# Patient Record
Sex: MALE | Race: WHITE | NOT HISPANIC OR LATINO
[De-identification: names, ages, dates, MRNs, and addresses within clinical notes are randomized per-mention and may not be internally consistent; named-entity substitution may affect disease eponyms.]

---

## 2019-03-13 ENCOUNTER — APPOINTMENT (OUTPATIENT)
Dept: INTERNAL MEDICINE | Facility: CLINIC | Age: 54
End: 2019-03-13
Payer: COMMERCIAL

## 2019-03-13 VITALS — SYSTOLIC BLOOD PRESSURE: 160 MMHG | DIASTOLIC BLOOD PRESSURE: 110 MMHG | RESPIRATION RATE: 16 BRPM | TEMPERATURE: 98 F

## 2019-03-13 VITALS
HEIGHT: 73 IN | BODY MASS INDEX: 31.28 KG/M2 | WEIGHT: 236 LBS | SYSTOLIC BLOOD PRESSURE: 128 MMHG | DIASTOLIC BLOOD PRESSURE: 82 MMHG

## 2019-03-13 DIAGNOSIS — Z78.9 OTHER SPECIFIED HEALTH STATUS: ICD-10-CM

## 2019-03-13 PROCEDURE — 99213 OFFICE O/P EST LOW 20 MIN: CPT

## 2019-03-13 NOTE — ASSESSMENT
[FreeTextEntry1] : right knee pain--no overt injury\par aodm\par htn\par \par f/u with reg f/u 5/19\par xray and ptx of right knee--consider further rxn in not improving

## 2019-03-13 NOTE — PHYSICAL EXAM
[No Acute Distress] : no acute distress [No Respiratory Distress] : no respiratory distress  [Normal Rate] : normal rate  [Regular Rhythm] : with a regular rhythm [Soft] : abdomen soft [de-identified] : local medial right knee pain--no instability

## 2019-04-02 ENCOUNTER — RX RENEWAL (OUTPATIENT)
Age: 54
End: 2019-04-02

## 2019-04-02 RX ORDER — BLOOD SUGAR DIAGNOSTIC
STRIP MISCELLANEOUS
Qty: 400 | Refills: 0 | Status: ACTIVE | COMMUNITY
Start: 2019-04-02 | End: 1900-01-01

## 2019-04-09 ENCOUNTER — RX RENEWAL (OUTPATIENT)
Age: 54
End: 2019-04-09

## 2019-05-09 DIAGNOSIS — M25.50 PAIN IN UNSPECIFIED JOINT: ICD-10-CM

## 2019-07-12 ENCOUNTER — RX RENEWAL (OUTPATIENT)
Age: 54
End: 2019-07-12

## 2019-08-15 ENCOUNTER — RX RENEWAL (OUTPATIENT)
Age: 54
End: 2019-08-15

## 2019-10-08 ENCOUNTER — NON-APPOINTMENT (OUTPATIENT)
Age: 54
End: 2019-10-08

## 2019-10-08 ENCOUNTER — MED ADMIN CHARGE (OUTPATIENT)
Age: 54
End: 2019-10-08

## 2019-10-08 ENCOUNTER — APPOINTMENT (OUTPATIENT)
Dept: INTERNAL MEDICINE | Facility: CLINIC | Age: 54
End: 2019-10-08
Payer: COMMERCIAL

## 2019-10-08 ENCOUNTER — LABORATORY RESULT (OUTPATIENT)
Age: 54
End: 2019-10-08

## 2019-10-08 VITALS
BODY MASS INDEX: 30.09 KG/M2 | SYSTOLIC BLOOD PRESSURE: 121 MMHG | OXYGEN SATURATION: 97 % | DIASTOLIC BLOOD PRESSURE: 80 MMHG | HEART RATE: 59 BPM | HEIGHT: 73 IN | WEIGHT: 227 LBS | RESPIRATION RATE: 16 BRPM | TEMPERATURE: 97.9 F

## 2019-10-08 PROCEDURE — 90471 IMMUNIZATION ADMIN: CPT

## 2019-10-08 PROCEDURE — 93000 ELECTROCARDIOGRAM COMPLETE: CPT

## 2019-10-08 PROCEDURE — 36415 COLL VENOUS BLD VENIPUNCTURE: CPT

## 2019-10-08 PROCEDURE — G0444 DEPRESSION SCREEN ANNUAL: CPT

## 2019-10-08 PROCEDURE — 99396 PREV VISIT EST AGE 40-64: CPT | Mod: 25

## 2019-10-08 PROCEDURE — 90688 IIV4 VACCINE SPLT 0.5 ML IM: CPT

## 2019-10-08 RX ORDER — METFORMIN HYDROCHLORIDE 1000 MG/1
1000 TABLET, EXTENDED RELEASE ORAL
Refills: 0 | Status: DISCONTINUED | COMMUNITY
End: 2019-10-08

## 2019-10-08 NOTE — HEALTH RISK ASSESSMENT
[Excellent] : ~his/her~  mood as  excellent [Yes] : Yes [Monthly or less (1 pt)] : Monthly or less (1 point) [1 or 2 (0 pts)] : 1 or 2 (0 points) [No falls in past year] : Patient reported no falls in the past year [Never (0 pts)] : Never (0 points) [No] : In the past 12 months have you used drugs other than those required for medical reasons? No [0] : 1) Little interest or pleasure doing things: Not at all (0) [1] : 2) Feeling down, depressed, or hopeless for several days (1) [No Retinopathy] : No retinopathy [Patient reported colonoscopy was normal] : Patient reported colonoscopy was normal [Fully functional (bathing, dressing, toileting, transferring, walking, feeding)] : Fully functional (bathing, dressing, toileting, transferring, walking, feeding) [Fully functional (using the telephone, shopping, preparing meals, housekeeping, doing laundry, using] : Fully functional and needs no help or supervision to perform IADLs (using the telephone, shopping, preparing meals, housekeeping, doing laundry, using transportation, managing medications and managing finances) [] : No [Audit-CScore] : 1 [[id number]] : 1 [EyeExamDate] : 09/18 [Reports changes in hearing] : Reports no changes in hearing [Reports changes in vision] : Reports no changes in vision [ColonoscopyDate] : 11/16 [ColonoscopyComments] : repeat 2019

## 2019-10-08 NOTE — HISTORY OF PRESENT ILLNESS
[de-identified] : 53 yo male here for annual wellness exam. pt lost 30 pounds with dietary changes, would like to decrease metformin dose if a1c is stable and possibly stop hctz [FreeTextEntry1] : cpx

## 2019-10-15 LAB
ALBUMIN SERPL ELPH-MCNC: 4.2 G/DL
ALP BLD-CCNC: 103 U/L
ALT SERPL-CCNC: 33 U/L
ANION GAP SERPL CALC-SCNC: 13 MMOL/L
APPEARANCE: CLEAR
AST SERPL-CCNC: 32 U/L
BACTERIA: NEGATIVE
BASOPHILS # BLD AUTO: 0.01 K/UL
BASOPHILS NFR BLD AUTO: 0.3 %
BILIRUB SERPL-MCNC: 0.8 MG/DL
BILIRUBIN URINE: NEGATIVE
BLOOD URINE: NEGATIVE
BUN SERPL-MCNC: 12 MG/DL
CALCIUM OXALATE CRYSTALS: ABNORMAL
CALCIUM SERPL-MCNC: 9.8 MG/DL
CHLORIDE SERPL-SCNC: 101 MMOL/L
CHOLEST SERPL-MCNC: 161 MG/DL
CHOLEST/HDLC SERPL: 3.5 RATIO
CO2 SERPL-SCNC: 27 MMOL/L
COLOR: YELLOW
CREAT SERPL-MCNC: 0.77 MG/DL
EOSINOPHIL # BLD AUTO: 0.1 K/UL
EOSINOPHIL NFR BLD AUTO: 2.7 %
ESTIMATED AVERAGE GLUCOSE: 126 MG/DL
FOLATE SERPL-MCNC: 12.9 NG/ML
GLUCOSE QUALITATIVE U: NEGATIVE
GLUCOSE SERPL-MCNC: 107 MG/DL
HBA1C MFR BLD HPLC: 6 %
HCT VFR BLD CALC: 41 %
HDLC SERPL-MCNC: 46 MG/DL
HGB BLD-MCNC: 13.5 G/DL
HYALINE CASTS: 0 /LPF
IMM GRANULOCYTES NFR BLD AUTO: 0.3 %
KETONES URINE: NEGATIVE
LDLC SERPL CALC-MCNC: 53 MG/DL
LEUKOCYTE ESTERASE URINE: NEGATIVE
LYMPHOCYTES # BLD AUTO: 1.47 K/UL
LYMPHOCYTES NFR BLD AUTO: 39.6 %
MAN DIFF?: NORMAL
MCHC RBC-ENTMCNC: 32.1 PG
MCHC RBC-ENTMCNC: 32.9 GM/DL
MCV RBC AUTO: 97.6 FL
MICROSCOPIC-UA: NORMAL
MONOCYTES # BLD AUTO: 0.63 K/UL
MONOCYTES NFR BLD AUTO: 17 %
NEUTROPHILS # BLD AUTO: 1.49 K/UL
NEUTROPHILS NFR BLD AUTO: 40.1 %
NITRITE URINE: NEGATIVE
PH URINE: 7
PLATELET # BLD AUTO: 68 K/UL
POTASSIUM SERPL-SCNC: 3.8 MMOL/L
PROT SERPL-MCNC: 7.2 G/DL
PROTEIN URINE: NORMAL
PSA FREE FLD-MCNC: 39 %
PSA FREE SERPL-MCNC: 0.15 NG/ML
PSA SERPL-MCNC: 0.38 NG/ML
RBC # BLD: 4.2 M/UL
RBC # FLD: 13.5 %
RED BLOOD CELLS URINE: 0 /HPF
SODIUM SERPL-SCNC: 141 MMOL/L
SPECIFIC GRAVITY URINE: 1.02
SQUAMOUS EPITHELIAL CELLS: 0 /HPF
TRIGL SERPL-MCNC: 308 MG/DL
TSH SERPL-ACNC: 1.81 UIU/ML
UROBILINOGEN URINE: ABNORMAL
VIT B12 SERPL-MCNC: 362 PG/ML
WBC # FLD AUTO: 3.71 K/UL
WHITE BLOOD CELLS URINE: 0 /HPF

## 2019-11-04 ENCOUNTER — MEDICATION RENEWAL (OUTPATIENT)
Age: 54
End: 2019-11-04

## 2019-11-07 ENCOUNTER — MEDICATION RENEWAL (OUTPATIENT)
Age: 54
End: 2019-11-07

## 2020-02-04 ENCOUNTER — APPOINTMENT (OUTPATIENT)
Dept: INTERNAL MEDICINE | Facility: CLINIC | Age: 55
End: 2020-02-04
Payer: COMMERCIAL

## 2020-02-04 VITALS
DIASTOLIC BLOOD PRESSURE: 89 MMHG | RESPIRATION RATE: 16 BRPM | WEIGHT: 227 LBS | OXYGEN SATURATION: 97 % | TEMPERATURE: 98 F | HEART RATE: 100 BPM | SYSTOLIC BLOOD PRESSURE: 135 MMHG | BODY MASS INDEX: 30.09 KG/M2 | HEIGHT: 73 IN

## 2020-02-04 PROCEDURE — 36415 COLL VENOUS BLD VENIPUNCTURE: CPT

## 2020-02-04 PROCEDURE — 99214 OFFICE O/P EST MOD 30 MIN: CPT | Mod: 25

## 2020-02-04 NOTE — HISTORY OF PRESENT ILLNESS
[FreeTextEntry8] : 53 yo male here to assess right heel after stepped on broken glass bottle. pt believes that he got it all out yesterday, last tdap was <10 years ago. pt has some mild pain with bearing weight in the area

## 2020-02-04 NOTE — PHYSICAL EXAM
[No Rash] : no rash [Normal] : affect was normal and insight and judgment were intact [de-identified] : +healing puncture wound on right heel without erythema, drainage; slight TTP, no glass visualized on inspection

## 2020-02-04 NOTE — PLAN
[FreeTextEntry1] : clean with warm, soapy water, apply bacitracin twice a day\par pt currently on metformin 2 tabs daily (decr from 4 tabs 4 months ago), will f/u a1c to determine if cont 2 tabs or increase- will send refill at that time

## 2020-02-10 LAB
ALBUMIN SERPL ELPH-MCNC: 4.1 G/DL
ALP BLD-CCNC: 59 U/L
ALT SERPL-CCNC: 34 U/L
ANION GAP SERPL CALC-SCNC: 14 MMOL/L
AST SERPL-CCNC: 37 U/L
BASOPHILS # BLD AUTO: 0.04 K/UL
BASOPHILS NFR BLD AUTO: 0.7 %
BILIRUB SERPL-MCNC: 0.9 MG/DL
BUN SERPL-MCNC: 8 MG/DL
CALCIUM SERPL-MCNC: 9.4 MG/DL
CHLORIDE SERPL-SCNC: 107 MMOL/L
CO2 SERPL-SCNC: 24 MMOL/L
CREAT SERPL-MCNC: 0.83 MG/DL
EOSINOPHIL # BLD AUTO: 0.06 K/UL
EOSINOPHIL NFR BLD AUTO: 1.1 %
ESTIMATED AVERAGE GLUCOSE: 160 MG/DL
GLUCOSE SERPL-MCNC: 170 MG/DL
HBA1C MFR BLD HPLC: 7.2 %
HCT VFR BLD CALC: 47.6 %
HGB BLD-MCNC: 16.1 G/DL
IMM GRANULOCYTES NFR BLD AUTO: 0.2 %
LYMPHOCYTES # BLD AUTO: 2.19 K/UL
LYMPHOCYTES NFR BLD AUTO: 38.8 %
MAN DIFF?: NORMAL
MCHC RBC-ENTMCNC: 31.8 PG
MCHC RBC-ENTMCNC: 33.8 GM/DL
MCV RBC AUTO: 93.9 FL
MONOCYTES # BLD AUTO: 0.77 K/UL
MONOCYTES NFR BLD AUTO: 13.6 %
NEUTROPHILS # BLD AUTO: 2.58 K/UL
NEUTROPHILS NFR BLD AUTO: 45.6 %
PLATELET # BLD AUTO: 86 K/UL
POTASSIUM SERPL-SCNC: 4.3 MMOL/L
PROT SERPL-MCNC: 7.3 G/DL
RBC # BLD: 5.07 M/UL
RBC # FLD: 13.8 %
SODIUM SERPL-SCNC: 145 MMOL/L
WBC # FLD AUTO: 5.65 K/UL

## 2020-04-16 ENCOUNTER — RX RENEWAL (OUTPATIENT)
Age: 55
End: 2020-04-16

## 2020-04-24 ENCOUNTER — RX RENEWAL (OUTPATIENT)
Age: 55
End: 2020-04-24

## 2020-05-12 ENCOUNTER — APPOINTMENT (OUTPATIENT)
Dept: INTERNAL MEDICINE | Facility: CLINIC | Age: 55
End: 2020-05-12
Payer: COMMERCIAL

## 2020-05-12 ENCOUNTER — LABORATORY RESULT (OUTPATIENT)
Age: 55
End: 2020-05-12

## 2020-05-12 VITALS
DIASTOLIC BLOOD PRESSURE: 84 MMHG | TEMPERATURE: 98.2 F | BODY MASS INDEX: 31.14 KG/M2 | OXYGEN SATURATION: 97 % | HEART RATE: 80 BPM | HEIGHT: 73 IN | SYSTOLIC BLOOD PRESSURE: 146 MMHG | WEIGHT: 235 LBS | RESPIRATION RATE: 18 BRPM

## 2020-05-12 VITALS — SYSTOLIC BLOOD PRESSURE: 130 MMHG | DIASTOLIC BLOOD PRESSURE: 80 MMHG
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
## 2020-05-12 PROCEDURE — 99214 OFFICE O/P EST MOD 30 MIN: CPT | Mod: 25

## 2020-05-12 PROCEDURE — 36415 COLL VENOUS BLD VENIPUNCTURE: CPT

## 2020-05-12 NOTE — PHYSICAL EXAM
[No Varicosities] : no varicosities [Pedal Pulses Present] : the pedal pulses are present [No Edema] : there was no peripheral edema [No Extremity Clubbing/Cyanosis] : no extremity clubbing/cyanosis [Normal] : affect was normal and insight and judgment were intact

## 2020-05-12 NOTE — HISTORY OF PRESENT ILLNESS
[Diabetes Mellitus] : Diabetes Mellitus [Other: ___] : [unfilled] [Patient was last seen on ___] : Patient was last seen on [unfilled] [Most Recent A1C: ___] : Most recent A1C was [unfilled] [Target A1C:  ___] : Target A1C is [unfilled] [Near target goal] : A1C near target goal [No therapy] : Patient is not on statin therapy [FreeTextEntry6] : pt c/o "circulation issues in feet" - states that sometimes they feel slightly numb after standing on them all day at work  [Does not check] : Patient does not check blood glucose regularly [Regular podiatrist visits] : Patient had regular podiatrist visits [Understanding of foot care] : Patient expressed understanding of foot care [FreeTextEntry1] : check platelets today after d/c of asa 81 mg

## 2020-05-18 LAB
ALBUMIN SERPL ELPH-MCNC: 4.4 G/DL
ALP BLD-CCNC: 67 U/L
ALT SERPL-CCNC: 25 U/L
ANION GAP SERPL CALC-SCNC: 13 MMOL/L
AST SERPL-CCNC: 30 U/L
BASOPHILS # BLD AUTO: 0.02 K/UL
BASOPHILS NFR BLD AUTO: 0.4 %
BILIRUB SERPL-MCNC: 1.4 MG/DL
BUN SERPL-MCNC: 14 MG/DL
CALCIUM SERPL-MCNC: 9.3 MG/DL
CHLORIDE SERPL-SCNC: 103 MMOL/L
CO2 SERPL-SCNC: 25 MMOL/L
CREAT SERPL-MCNC: 0.74 MG/DL
EOSINOPHIL # BLD AUTO: 0.1 K/UL
EOSINOPHIL NFR BLD AUTO: 1.8 %
ESTIMATED AVERAGE GLUCOSE: 154 MG/DL
GLUCOSE SERPL-MCNC: 102 MG/DL
HBA1C MFR BLD HPLC: 7 %
HCT VFR BLD CALC: 42.4 %
HGB BLD-MCNC: 14.4 G/DL
IMM GRANULOCYTES NFR BLD AUTO: 0.2 %
LYMPHOCYTES # BLD AUTO: 1.44 K/UL
LYMPHOCYTES NFR BLD AUTO: 26.1 %
MAN DIFF?: NORMAL
MCHC RBC-ENTMCNC: 31.6 PG
MCHC RBC-ENTMCNC: 34 GM/DL
MCV RBC AUTO: 93 FL
MONOCYTES # BLD AUTO: 0.72 K/UL
MONOCYTES NFR BLD AUTO: 13.1 %
NEUTROPHILS # BLD AUTO: 3.22 K/UL
NEUTROPHILS NFR BLD AUTO: 58.4 %
PLATELET # BLD AUTO: 58 K/UL
POTASSIUM SERPL-SCNC: 3.9 MMOL/L
PROT SERPL-MCNC: 7.5 G/DL
RBC # BLD: 4.56 M/UL
RBC # FLD: 13.2 %
SARS-COV-2 IGG SERPL IA-ACNC: 0.1 INDEX
SARS-COV-2 IGG SERPL QL IA: NEGATIVE
SODIUM SERPL-SCNC: 141 MMOL/L
WBC # FLD AUTO: 5.51 K/UL

## 2020-07-22 ENCOUNTER — RX RENEWAL (OUTPATIENT)
Age: 55
End: 2020-07-22

## 2020-11-10 ENCOUNTER — RX RENEWAL (OUTPATIENT)
Age: 55
End: 2020-11-10

## 2020-11-16 ENCOUNTER — NON-APPOINTMENT (OUTPATIENT)
Age: 55
End: 2020-11-16

## 2020-11-16 ENCOUNTER — APPOINTMENT (OUTPATIENT)
Dept: INTERNAL MEDICINE | Facility: CLINIC | Age: 55
End: 2020-11-16

## 2020-11-16 ENCOUNTER — RX RENEWAL (OUTPATIENT)
Age: 55
End: 2020-11-16

## 2020-11-16 ENCOUNTER — LABORATORY RESULT (OUTPATIENT)
Age: 55
End: 2020-11-16

## 2020-11-16 ENCOUNTER — APPOINTMENT (OUTPATIENT)
Dept: INTERNAL MEDICINE | Facility: CLINIC | Age: 55
End: 2020-11-16
Payer: COMMERCIAL

## 2020-11-16 VITALS
HEART RATE: 74 BPM | BODY MASS INDEX: 31.41 KG/M2 | OXYGEN SATURATION: 97 % | TEMPERATURE: 98.4 F | DIASTOLIC BLOOD PRESSURE: 74 MMHG | WEIGHT: 237 LBS | SYSTOLIC BLOOD PRESSURE: 136 MMHG | HEIGHT: 73 IN | RESPIRATION RATE: 16 BRPM

## 2020-11-16 DIAGNOSIS — Z83.3 FAMILY HISTORY OF DIABETES MELLITUS: ICD-10-CM

## 2020-11-16 DIAGNOSIS — Z20.828 CONTACT WITH AND (SUSPECTED) EXPOSURE TO OTHER VIRAL COMMUNICABLE DISEASES: ICD-10-CM

## 2020-11-16 PROCEDURE — 36415 COLL VENOUS BLD VENIPUNCTURE: CPT

## 2020-11-16 PROCEDURE — 93000 ELECTROCARDIOGRAM COMPLETE: CPT

## 2020-11-16 PROCEDURE — 99072 ADDL SUPL MATRL&STAF TM PHE: CPT

## 2020-11-16 PROCEDURE — 99396 PREV VISIT EST AGE 40-64: CPT | Mod: 25

## 2020-11-16 RX ORDER — ASPIRIN 81 MG
81 TABLET, DELAYED RELEASE (ENTERIC COATED) ORAL
Refills: 0 | Status: DISCONTINUED | COMMUNITY
End: 2020-11-16

## 2020-11-16 NOTE — HISTORY OF PRESENT ILLNESS
[FreeTextEntry1] : cpx [de-identified] : 56 yo male here for annual wellness exam. pt c/o feet "aching" sporadically for a couple of months that is staying the same that has not changed in frequency or severity that he describes as a sharp pain.

## 2020-11-16 NOTE — HEALTH RISK ASSESSMENT
[Yes] : Yes [1 or 2 (0 pts)] : 1 or 2 (0 points) [Never (0 pts)] : Never (0 points) [No] : In the past 12 months have you used drugs other than those required for medical reasons? No [No falls in past year] : Patient reported no falls in the past year [0] : 2) Feeling down, depressed, or hopeless: Not at all (0) [Fully functional (bathing, dressing, toileting, transferring, walking, feeding)] : Fully functional (bathing, dressing, toileting, transferring, walking, feeding) [Fully functional (using the telephone, shopping, preparing meals, housekeeping, doing laundry, using] : Fully functional and needs no help or supervision to perform IADLs (using the telephone, shopping, preparing meals, housekeeping, doing laundry, using transportation, managing medications and managing finances) [2 - 4 times a month (2 pts)] : 2-4 times a month (2 points) [Patient reported colonoscopy was normal] : Patient reported colonoscopy was normal [Reviewed no changes] : Reviewed no changes [] : No [Audit-CScore] : 2 [UYU1Hhtss] : 0 [ColonoscopyDate] : 10/19 [ColonoscopyComments] : 2 polyps removed, repeat in 2022 [AdvancecareDate] : 11/20

## 2020-11-17 LAB
APPEARANCE: CLEAR
BACTERIA: NEGATIVE
BILIRUBIN URINE: NEGATIVE
BLOOD URINE: NEGATIVE
COLOR: NORMAL
ESTIMATED AVERAGE GLUCOSE: 146 MG/DL
FERRITIN SERPL-MCNC: 1072 NG/ML
FOLATE SERPL-MCNC: 16.4 NG/ML
GLUCOSE QUALITATIVE U: NEGATIVE
HBA1C MFR BLD HPLC: 6.7 %
HCV AB SER QL: NONREACTIVE
HCV S/CO RATIO: 0.11 S/CO
HYALINE CASTS: 0 /LPF
KETONES URINE: NEGATIVE
LEUKOCYTE ESTERASE URINE: NEGATIVE
MICROSCOPIC-UA: NORMAL
NITRITE URINE: NEGATIVE
PH URINE: 6.5
PROTEIN URINE: NEGATIVE
PSA FREE FLD-MCNC: 51 %
PSA FREE SERPL-MCNC: 0.15 NG/ML
PSA SERPL-MCNC: 0.29 NG/ML
RED BLOOD CELLS URINE: 1 /HPF
SPECIFIC GRAVITY URINE: 1.01
SQUAMOUS EPITHELIAL CELLS: 0 /HPF
TSH SERPL-ACNC: 0.19 UIU/ML
UROBILINOGEN URINE: NORMAL
VIT B12 SERPL-MCNC: 325 PG/ML
WHITE BLOOD CELLS URINE: 0 /HPF

## 2020-11-18 LAB
ALBUMIN SERPL ELPH-MCNC: 4.2 G/DL
ALP BLD-CCNC: 66 U/L
ALT SERPL-CCNC: 29 U/L
ANION GAP SERPL CALC-SCNC: 23 MMOL/L
AST SERPL-CCNC: 39 U/L
BASOPHILS # BLD AUTO: 0.01 K/UL
BASOPHILS NFR BLD AUTO: 0.3 %
BILIRUB SERPL-MCNC: 0.8 MG/DL
BUN SERPL-MCNC: 12 MG/DL
CALCIUM SERPL-MCNC: 9.2 MG/DL
CHLORIDE SERPL-SCNC: 102 MMOL/L
CHOLEST SERPL-MCNC: 157 MG/DL
CO2 SERPL-SCNC: 17 MMOL/L
CREAT SERPL-MCNC: 0.83 MG/DL
EOSINOPHIL # BLD AUTO: 0.02 K/UL
EOSINOPHIL NFR BLD AUTO: 0.5 %
GLUCOSE SERPL-MCNC: 148 MG/DL
HCT VFR BLD CALC: 40.3 %
HDLC SERPL-MCNC: 46 MG/DL
HGB BLD-MCNC: 13.4 G/DL
IMM GRANULOCYTES NFR BLD AUTO: 0.3 %
LDLC SERPL CALC-MCNC: 72 MG/DL
LYMPHOCYTES # BLD AUTO: 1.26 K/UL
LYMPHOCYTES NFR BLD AUTO: 33.6 %
MAN DIFF?: NORMAL
MCHC RBC-ENTMCNC: 31.8 PG
MCHC RBC-ENTMCNC: 33.3 GM/DL
MCV RBC AUTO: 95.7 FL
MONOCYTES # BLD AUTO: 0.71 K/UL
MONOCYTES NFR BLD AUTO: 18.9 %
NEUTROPHILS # BLD AUTO: 1.74 K/UL
NEUTROPHILS NFR BLD AUTO: 46.4 %
NONHDLC SERPL-MCNC: 111 MG/DL
PLATELET # BLD AUTO: 48 K/UL
POTASSIUM SERPL-SCNC: 4 MMOL/L
PROT SERPL-MCNC: 7.1 G/DL
RBC # BLD: 4.21 M/UL
RBC # FLD: 13.2 %
SODIUM SERPL-SCNC: 142 MMOL/L
TRIGL SERPL-MCNC: 192 MG/DL
URATE SERPL-MCNC: 4.3 MG/DL
WBC # FLD AUTO: 3.75 K/UL

## 2020-11-20 ENCOUNTER — APPOINTMENT (OUTPATIENT)
Dept: CARDIOLOGY | Facility: CLINIC | Age: 55
End: 2020-11-20
Payer: COMMERCIAL

## 2020-11-20 PROCEDURE — 93306 TTE W/DOPPLER COMPLETE: CPT

## 2020-11-23 ENCOUNTER — NON-APPOINTMENT (OUTPATIENT)
Age: 55
End: 2020-11-23

## 2020-11-23 ENCOUNTER — APPOINTMENT (OUTPATIENT)
Dept: CARDIOLOGY | Facility: CLINIC | Age: 55
End: 2020-11-23
Payer: COMMERCIAL

## 2020-11-23 VITALS
RESPIRATION RATE: 16 BRPM | DIASTOLIC BLOOD PRESSURE: 81 MMHG | HEART RATE: 75 BPM | BODY MASS INDEX: 31.41 KG/M2 | OXYGEN SATURATION: 97 % | HEIGHT: 73 IN | TEMPERATURE: 99.2 F | SYSTOLIC BLOOD PRESSURE: 152 MMHG | WEIGHT: 237 LBS

## 2020-11-23 LAB
TESTOST BND SERPL-MCNC: 3.7 PG/ML
TESTOST SERPL-MCNC: 230.7 NG/DL

## 2020-11-23 PROCEDURE — 93000 ELECTROCARDIOGRAM COMPLETE: CPT

## 2020-11-23 PROCEDURE — 99204 OFFICE O/P NEW MOD 45 MIN: CPT

## 2020-11-23 NOTE — PHYSICAL EXAM
[General Appearance - Well Developed] : well developed [Normal Appearance] : normal appearance [General Appearance - Well Nourished] : well nourished [Heart Rate And Rhythm] : heart rate and rhythm were normal [Heart Sounds] : normal S1 and S2 [Murmurs] : no murmurs present [Edema] : no peripheral edema present [FreeTextEntry1] : radial pulses 2+ b/l [] : no respiratory distress [Respiration, Rhythm And Depth] : normal respiratory rhythm and effort [Exaggerated Use Of Accessory Muscles For Inspiration] : no accessory muscle use [Auscultation Breath Sounds / Voice Sounds] : lungs were clear to auscultation bilaterally [Abdomen Soft] : soft [Abdomen Tenderness] : non-tender [Abnormal Walk] : normal gait [Skin Turgor] : normal skin turgor [Oriented To Time, Place, And Person] : oriented to person, place, and time [Impaired Insight] : insight and judgment were intact [Affect] : the affect was normal [Mood] : the mood was normal

## 2020-11-23 NOTE — HISTORY OF PRESENT ILLNESS
[FreeTextEntry1] : 55 w DM, HTN presents to establish cardiovascular care\par Sent in by PMD: Louise TAVAREZ\par \par pt states overall he feels well. pt denies cp or sob at rest or on exertion. pt does endorse palpitations, sometimes he wakes up with a "jumping" in his chest. pt denies dizziness, syncope, diaphoresis. pt denies LE edema, orthopnea. pt states he can walk several miles without difficulty. 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 pt state he feels run down. low energy level over the past few months. \par \par Prior cardiac workup: none\par Recent labs: 11/2020: tot chol 157 tg 192 hdl 46 ldl 72 Cr 0.83 GFR 98 a1c 6.7\par \par Med hx: DM2 HTN\par Sx hx: appendectomy\par Fam hx: no known cardiac hx. \par Social hx: lives in [place] with wife and 2 kids. 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 \par Meds: losartan-hctz 50-12.5 metformin, viagra prn\par Allergies: nkda\par \par

## 2020-11-23 NOTE — REVIEW OF SYSTEMS
[Feeling Fatigued] : feeling fatigued [Fever] : no fever [Headache] : no headache [Recent Weight Gain (___ Lbs)] : no recent weight gain [Chills] : no chills [Recent Weight Loss (___ Lbs)] : no recent weight loss [Sore Throat] : no sore throat [Shortness Of Breath] : no shortness of breath [Dyspnea on exertion] : not dyspnea during exertion [Chest  Pressure] : no chest pressure [Chest Pain] : no chest pain [Lower Ext Edema] : no extremity edema [Leg Claudication] : no intermittent leg claudication [Palpitations] : no palpitations [Cough] : no cough [Wheezing] : no wheezing [Nausea] : no nausea [Vomiting] : no vomiting [Joint Pain] : no joint pain [Dizziness] : no dizziness [Confusion] : no confusion was observed [Excessive Thirst] : no polydipsia [Easy Bleeding] : no tendency for easy bleeding [Easy Bruising] : no tendency for easy bruising

## 2020-11-23 NOTE — DISCUSSION/SUMMARY
[FreeTextEntry1] : 55 w DM, HTN presents to establish cardiovascular care\par \par 1. health maintenance\par -pt overall feeling well, recent TTE grossly unremarkable except for episodes of frequent irregular dinus beats. baseline EKG without arrhythmia. in the absence of symptoms, will hold off on w/u for now, will cont to monitor.\par -pt has calcium score pending, states he will get it next week, will f/u results\par \par f/u 6 months or sooner if pt requires.

## 2020-12-22 RX ORDER — PENCICLOVIR 10 MG/G
1 CREAM TOPICAL
Qty: 1 | Refills: 0 | Status: DISCONTINUED | COMMUNITY
Start: 2020-07-24 | End: 2020-12-22

## 2020-12-22 RX ORDER — TESTOSTERONE 50 MG/5G
50 MG/5GM GEL TRANSDERMAL
Qty: 30 | Refills: 0 | Status: DISCONTINUED | COMMUNITY
Start: 2020-11-23 | End: 2020-12-22

## 2021-02-11 ENCOUNTER — RX RENEWAL (OUTPATIENT)
Age: 56
End: 2021-02-11

## 2021-04-26 ENCOUNTER — APPOINTMENT (OUTPATIENT)
Dept: INTERNAL MEDICINE | Facility: CLINIC | Age: 56
End: 2021-04-26

## 2021-05-03 ENCOUNTER — APPOINTMENT (OUTPATIENT)
Dept: INTERNAL MEDICINE | Facility: CLINIC | Age: 56
End: 2021-05-03
Payer: COMMERCIAL

## 2021-05-03 VITALS
SYSTOLIC BLOOD PRESSURE: 125 MMHG | HEART RATE: 102 BPM | OXYGEN SATURATION: 97 % | TEMPERATURE: 98.5 F | HEIGHT: 73 IN | BODY MASS INDEX: 30.22 KG/M2 | WEIGHT: 228 LBS | DIASTOLIC BLOOD PRESSURE: 95 MMHG

## 2021-05-03 DIAGNOSIS — Z23 ENCOUNTER FOR IMMUNIZATION: ICD-10-CM

## 2021-05-03 DIAGNOSIS — R79.89 OTHER SPECIFIED ABNORMAL FINDINGS OF BLOOD CHEMISTRY: ICD-10-CM

## 2021-05-03 PROCEDURE — 99214 OFFICE O/P EST MOD 30 MIN: CPT | Mod: 25

## 2021-05-03 PROCEDURE — 36415 COLL VENOUS BLD VENIPUNCTURE: CPT

## 2021-05-03 PROCEDURE — 99072 ADDL SUPL MATRL&STAF TM PHE: CPT

## 2021-05-03 NOTE — HISTORY OF PRESENT ILLNESS
[Diabetes Mellitus] : Diabetes Mellitus [Hypertension] : Hypertension [Other: ___] : [unfilled] [Patient was last seen on ___] : Patient was last seen on [unfilled] [Most Recent A1C: ___] : Most recent A1C was [unfilled] [Target A1C:  ___] : Target A1C is [unfilled] [<140/90] : Target blood pressure is <140/90 [Target goal met] : BP target goal met [FreeTextEntry1] : low t- check bw today, cont med\par thrombocytopenia- educated multiple times to f/u heme but has not yet

## 2021-05-03 NOTE — PLAN
[FreeTextEntry1] : phone number for his hematologist dr radha savage given to patient so he can call for follow up, pt states he is going to call when he leaves here\par bw drawn in office\par proper diet and exercise

## 2021-05-04 LAB
ALBUMIN SERPL ELPH-MCNC: 3.8 G/DL
ALP BLD-CCNC: 81 U/L
ALT SERPL-CCNC: 41 U/L
ANION GAP SERPL CALC-SCNC: 14 MMOL/L
AST SERPL-CCNC: 56 U/L
BASOPHILS # BLD AUTO: 0.02 K/UL
BASOPHILS NFR BLD AUTO: 0.4 %
BILIRUB SERPL-MCNC: 1.5 MG/DL
BUN SERPL-MCNC: 11 MG/DL
CALCIUM SERPL-MCNC: 9.2 MG/DL
CHLORIDE SERPL-SCNC: 105 MMOL/L
CO2 SERPL-SCNC: 22 MMOL/L
CREAT SERPL-MCNC: 0.87 MG/DL
EOSINOPHIL # BLD AUTO: 0.07 K/UL
EOSINOPHIL NFR BLD AUTO: 1.5 %
ESTIMATED AVERAGE GLUCOSE: 143 MG/DL
GLUCOSE SERPL-MCNC: 106 MG/DL
HBA1C MFR BLD HPLC: 6.6 %
HCT VFR BLD CALC: 48.2 %
HGB BLD-MCNC: 15.4 G/DL
IMM GRANULOCYTES NFR BLD AUTO: 0.2 %
LYMPHOCYTES # BLD AUTO: 1.87 K/UL
LYMPHOCYTES NFR BLD AUTO: 40 %
MAN DIFF?: NORMAL
MCHC RBC-ENTMCNC: 30.7 PG
MCHC RBC-ENTMCNC: 32 GM/DL
MCV RBC AUTO: 96 FL
MONOCYTES # BLD AUTO: 0.93 K/UL
MONOCYTES NFR BLD AUTO: 19.9 %
NEUTROPHILS # BLD AUTO: 1.78 K/UL
NEUTROPHILS NFR BLD AUTO: 38 %
PLATELET # BLD AUTO: 67 K/UL
POTASSIUM SERPL-SCNC: 4.5 MMOL/L
PROT SERPL-MCNC: 7 G/DL
RBC # BLD: 5.02 M/UL
RBC # FLD: 14.9 %
SODIUM SERPL-SCNC: 142 MMOL/L
TESTOST SERPL-MCNC: 244 NG/DL
WBC # FLD AUTO: 4.68 K/UL

## 2021-05-17 ENCOUNTER — RX RENEWAL (OUTPATIENT)
Age: 56
End: 2021-05-17

## 2021-06-01 ENCOUNTER — APPOINTMENT (OUTPATIENT)
Dept: CARDIOLOGY | Facility: CLINIC | Age: 56
End: 2021-06-01

## 2021-06-17 ENCOUNTER — APPOINTMENT (OUTPATIENT)
Dept: CARDIOLOGY | Facility: CLINIC | Age: 56
End: 2021-06-17

## 2021-10-28 ENCOUNTER — APPOINTMENT (OUTPATIENT)
Dept: INTERNAL MEDICINE | Facility: CLINIC | Age: 56
End: 2021-10-28
Payer: COMMERCIAL

## 2021-10-28 VITALS
WEIGHT: 226 LBS | BODY MASS INDEX: 29.95 KG/M2 | HEART RATE: 108 BPM | OXYGEN SATURATION: 96 % | TEMPERATURE: 97.7 F | HEIGHT: 73 IN

## 2021-10-28 VITALS — DIASTOLIC BLOOD PRESSURE: 82 MMHG | SYSTOLIC BLOOD PRESSURE: 132 MMHG

## 2021-10-28 DIAGNOSIS — M62.838 OTHER MUSCLE SPASM: ICD-10-CM

## 2021-10-28 PROCEDURE — 36415 COLL VENOUS BLD VENIPUNCTURE: CPT

## 2021-10-28 PROCEDURE — 99213 OFFICE O/P EST LOW 20 MIN: CPT | Mod: 25

## 2021-10-28 NOTE — HISTORY OF PRESENT ILLNESS
[FreeTextEntry1] : shoulder pain [de-identified] : about 3 yrs ago developed shoulder pain--worse now--saw ortho--offered surgery but he deferred\par meds and recent labs reviewed\par saw heme prior for low plts

## 2021-10-28 NOTE — PHYSICAL EXAM
[Normal] : no respiratory distress, lungs were clear to auscultation bilaterally and no accessory muscle use [de-identified] : local muscle spasm in left paracervical regiion--decrease ROM of motion of shoulder

## 2021-10-29 LAB
BASOPHILS # BLD AUTO: 0.01 K/UL
BASOPHILS NFR BLD AUTO: 0.2 %
EOSINOPHIL # BLD AUTO: 0.13 K/UL
EOSINOPHIL NFR BLD AUTO: 2.2 %
ESTIMATED AVERAGE GLUCOSE: 146 MG/DL
HBA1C MFR BLD HPLC: 6.7 %
HCT VFR BLD CALC: 43.8 %
HGB BLD-MCNC: 14.4 G/DL
IMM GRANULOCYTES NFR BLD AUTO: 0.2 %
LYMPHOCYTES # BLD AUTO: 2.49 K/UL
LYMPHOCYTES NFR BLD AUTO: 41.3 %
MAN DIFF?: NORMAL
MCHC RBC-ENTMCNC: 31.9 PG
MCHC RBC-ENTMCNC: 32.9 GM/DL
MCV RBC AUTO: 97.1 FL
MONOCYTES # BLD AUTO: 0.93 K/UL
MONOCYTES NFR BLD AUTO: 15.4 %
NEUTROPHILS # BLD AUTO: 2.46 K/UL
NEUTROPHILS NFR BLD AUTO: 40.7 %
PLATELET # BLD AUTO: 71 K/UL
RBC # BLD: 4.51 M/UL
RBC # FLD: 13.3 %
WBC # FLD AUTO: 6.03 K/UL

## 2021-11-22 ENCOUNTER — RX RENEWAL (OUTPATIENT)
Age: 56
End: 2021-11-22

## 2022-02-24 ENCOUNTER — APPOINTMENT (OUTPATIENT)
Dept: INTERNAL MEDICINE | Facility: CLINIC | Age: 57
End: 2022-02-24

## 2022-02-24 ENCOUNTER — RX CHANGE (OUTPATIENT)
Age: 57
End: 2022-02-24

## 2022-02-25 ENCOUNTER — RX CHANGE (OUTPATIENT)
Age: 57
End: 2022-02-25

## 2022-03-04 ENCOUNTER — RX CHANGE (OUTPATIENT)
Age: 57
End: 2022-03-04

## 2022-03-10 ENCOUNTER — NON-APPOINTMENT (OUTPATIENT)
Age: 57
End: 2022-03-10

## 2022-03-10 ENCOUNTER — APPOINTMENT (OUTPATIENT)
Dept: INTERNAL MEDICINE | Facility: CLINIC | Age: 57
End: 2022-03-10
Payer: COMMERCIAL

## 2022-03-10 VITALS
SYSTOLIC BLOOD PRESSURE: 129 MMHG | OXYGEN SATURATION: 97 % | BODY MASS INDEX: 29.29 KG/M2 | TEMPERATURE: 97.4 F | WEIGHT: 221 LBS | DIASTOLIC BLOOD PRESSURE: 80 MMHG | HEIGHT: 73 IN | HEART RATE: 100 BPM

## 2022-03-10 DIAGNOSIS — N52.9 MALE ERECTILE DYSFUNCTION, UNSPECIFIED: ICD-10-CM

## 2022-03-10 PROCEDURE — 93000 ELECTROCARDIOGRAM COMPLETE: CPT

## 2022-03-10 PROCEDURE — 36415 COLL VENOUS BLD VENIPUNCTURE: CPT

## 2022-03-10 PROCEDURE — 99214 OFFICE O/P EST MOD 30 MIN: CPT | Mod: 25

## 2022-03-10 RX ORDER — LOSARTAN POTASSIUM 50 MG/1
50 TABLET, FILM COATED ORAL
Qty: 90 | Refills: 0 | Status: DISCONTINUED | COMMUNITY
Start: 2019-07-12 | End: 2022-03-10

## 2022-03-10 NOTE — ASSESSMENT
[FreeTextEntry1] : pods eval\par check ecg\par renew meds\par d/w Dr. Virgen re: new afib--he will  see pt\par pt advised to contact provider in any cardiac symps develop\par check labs

## 2022-03-10 NOTE — HISTORY OF PRESENT ILLNESS
[FreeTextEntry1] : f/u htn, diabetes [de-identified] : reviewed labs and meds\par c/o foot numbness\par no cardio symps

## 2022-03-10 NOTE — PHYSICAL EXAM
[Normal] : soft, non-tender, non-distended, no masses palpated, no HSM and normal bowel sounds [de-identified] : irreg

## 2022-03-11 LAB
ALBUMIN SERPL ELPH-MCNC: 3.9 G/DL
ALP BLD-CCNC: 127 U/L
ALT SERPL-CCNC: 32 U/L
ANION GAP SERPL CALC-SCNC: 15 MMOL/L
AST SERPL-CCNC: 31 U/L
BASOPHILS # BLD AUTO: 0.04 K/UL
BASOPHILS NFR BLD AUTO: 0.7 %
BILIRUB SERPL-MCNC: 1.3 MG/DL
BUN SERPL-MCNC: 12 MG/DL
CALCIUM SERPL-MCNC: 9.6 MG/DL
CHLORIDE SERPL-SCNC: 102 MMOL/L
CO2 SERPL-SCNC: 23 MMOL/L
CREAT SERPL-MCNC: 0.73 MG/DL
EGFR: 106 ML/MIN/1.73M2
EOSINOPHIL # BLD AUTO: 0.1 K/UL
EOSINOPHIL NFR BLD AUTO: 1.7 %
GLUCOSE SERPL-MCNC: 326 MG/DL
HCT VFR BLD CALC: 45.2 %
HGB BLD-MCNC: 14.9 G/DL
IMM GRANULOCYTES NFR BLD AUTO: 0.3 %
LYMPHOCYTES # BLD AUTO: 1.94 K/UL
LYMPHOCYTES NFR BLD AUTO: 32.6 %
MAN DIFF?: NORMAL
MCHC RBC-ENTMCNC: 31.8 PG
MCHC RBC-ENTMCNC: 33 GM/DL
MCV RBC AUTO: 96.6 FL
MONOCYTES # BLD AUTO: 0.86 K/UL
MONOCYTES NFR BLD AUTO: 14.5 %
NEUTROPHILS # BLD AUTO: 2.99 K/UL
NEUTROPHILS NFR BLD AUTO: 50.2 %
PLATELET # BLD AUTO: 70 K/UL
POTASSIUM SERPL-SCNC: 4.4 MMOL/L
PROT SERPL-MCNC: 7.4 G/DL
RBC # BLD: 4.68 M/UL
RBC # FLD: 13.3 %
SODIUM SERPL-SCNC: 140 MMOL/L
TSH SERPL-ACNC: <0.01 UIU/ML
WBC # FLD AUTO: 5.95 K/UL

## 2022-03-14 ENCOUNTER — APPOINTMENT (OUTPATIENT)
Dept: CARDIOLOGY | Facility: CLINIC | Age: 57
End: 2022-03-14
Payer: COMMERCIAL

## 2022-03-14 ENCOUNTER — NON-APPOINTMENT (OUTPATIENT)
Age: 57
End: 2022-03-14

## 2022-03-14 VITALS
HEART RATE: 93 BPM | HEIGHT: 72 IN | DIASTOLIC BLOOD PRESSURE: 80 MMHG | OXYGEN SATURATION: 98 % | WEIGHT: 221 LBS | SYSTOLIC BLOOD PRESSURE: 125 MMHG | TEMPERATURE: 98.1 F | BODY MASS INDEX: 29.93 KG/M2

## 2022-03-14 LAB
T3FREE SERPL-MCNC: 5.2 PG/ML
T4 FREE SERPL-MCNC: 1.4 NG/DL
THYROGLOB AB SERPL-ACNC: <20 IU/ML
THYROPEROXIDASE AB SERPL IA-ACNC: 20.4 IU/ML

## 2022-03-14 PROCEDURE — 99215 OFFICE O/P EST HI 40 MIN: CPT

## 2022-03-14 PROCEDURE — 93000 ELECTROCARDIOGRAM COMPLETE: CPT

## 2022-03-14 NOTE — HISTORY OF PRESENT ILLNESS
[FreeTextEntry1] : 56M w/ DM, HTN afib, presents for f/u\par PMD: Dr Blake douglas\par \par pt last seen in cardiology for an initial eval 11/2020, with c/o palpitations, and low energy. TTE done at that time was grossly unremarkable. EKG without arrhythmia, and calcium score was 14. \par pt now presents for follow up. \par \par pt feeling well overall. pt denies cp or sob at rest or on exertion. denies dizziness, syncope, palpitations, diaphoresis.  pt denies LE edema, orthopnea. pt states he can walk several miles without difficulty. 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 pt state he feels run down. low energy level over the past few months. \par \par EKG today showing afib ?new diagnosis . \par \par Prior cardiac workup: none\par Recent labs: 11/2020: tot chol 157 tg 192 hdl 46 ldl 72 Cr 0.83 GFR 98 a1c 6.7\par \par Med hx: DM2 HTN\par Sx hx: appendectomy\par Fam hx: no known cardiac hx. \par Social hx: lives in [place] with wife and 2 kids. 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 \par Meds: losartan-hctz 50-12.5 metformin, viagra prn\par Allergies: nkda\par

## 2022-03-14 NOTE — PHYSICAL EXAM
[Well Developed] : well developed [Well Nourished] : well nourished [No Acute Distress] : no acute distress [Normal Venous Pressure] : normal venous pressure [Clear Lung Fields] : clear lung fields [Good Air Entry] : good air entry [No Respiratory Distress] : no respiratory distress  [Soft] : abdomen soft [Non Tender] : non-tender [Normal Gait] : normal gait [No Edema] : no edema [Moves all extremities] : moves all extremities [No Focal Deficits] : no focal deficits [Alert and Oriented] : alert and oriented [de-identified] : irregular, tachycardic

## 2022-03-14 NOTE — REVIEW OF SYSTEMS
[Joint Pain] : joint pain [Fever] : no fever [Headache] : no headache [Weight Gain (___ Lbs)] : no recent weight gain [Chills] : no chills [Feeling Fatigued] : not feeling fatigued [Weight Loss (___ Lbs)] : no recent weight loss [Sore Throat] : no sore throat [SOB] : no shortness of breath [Dyspnea on exertion] : not dyspnea during exertion [Chest Discomfort] : no chest discomfort [Lower Ext Edema] : no extremity edema [Palpitations] : no palpitations [Orthopnea] : no orthopnea [Syncope] : no syncope [Dizziness] : no dizziness [Confusion] : no confusion was observed [Easy Bleeding] : no tendency for easy bleeding [Easy Bruising] : no tendency for easy bruising [FreeTextEntry9] : L shoulder MSK pain

## 2022-03-14 NOTE — DISCUSSION/SUMMARY
[FreeTextEntry1] : 56M with HTN, DM ,now found to have new afib presents for folow up\par \par 1. HTN\par -controlled\par -cont losartan-hctz 50-12.5\par \par 2. 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

## 2022-03-16 LAB — TSI ACT/NOR SER: 1.49 IU/L

## 2022-03-18 ENCOUNTER — NON-APPOINTMENT (OUTPATIENT)
Age: 57
End: 2022-03-18

## 2022-05-09 ENCOUNTER — APPOINTMENT (OUTPATIENT)
Dept: INTERNAL MEDICINE | Facility: CLINIC | Age: 57
End: 2022-05-09
Payer: COMMERCIAL

## 2022-05-09 ENCOUNTER — APPOINTMENT (OUTPATIENT)
Dept: ENDOCRINOLOGY | Facility: CLINIC | Age: 57
End: 2022-05-09
Payer: COMMERCIAL

## 2022-05-09 VITALS
BODY MASS INDEX: 27.91 KG/M2 | SYSTOLIC BLOOD PRESSURE: 140 MMHG | HEIGHT: 72 IN | HEART RATE: 87 BPM | OXYGEN SATURATION: 98 % | WEIGHT: 206.06 LBS | DIASTOLIC BLOOD PRESSURE: 86 MMHG

## 2022-05-09 VITALS — SYSTOLIC BLOOD PRESSURE: 118 MMHG | DIASTOLIC BLOOD PRESSURE: 76 MMHG

## 2022-05-09 VITALS
TEMPERATURE: 98.3 F | WEIGHT: 207 LBS | BODY MASS INDEX: 28.04 KG/M2 | HEIGHT: 72 IN | HEART RATE: 95 BPM | OXYGEN SATURATION: 97 %

## 2022-05-09 DIAGNOSIS — Z00.00 ENCOUNTER FOR GENERAL ADULT MEDICAL EXAMINATION W/OUT ABNORMAL FINDINGS: ICD-10-CM

## 2022-05-09 LAB
GLUCOSE BLDC GLUCOMTR-MCNC: 328
HBA1C MFR BLD HPLC: 12.3

## 2022-05-09 PROCEDURE — 82962 GLUCOSE BLOOD TEST: CPT

## 2022-05-09 PROCEDURE — 36415 COLL VENOUS BLD VENIPUNCTURE: CPT

## 2022-05-09 PROCEDURE — 99396 PREV VISIT EST AGE 40-64: CPT | Mod: 25

## 2022-05-09 PROCEDURE — 99204 OFFICE O/P NEW MOD 45 MIN: CPT | Mod: 25

## 2022-05-09 PROCEDURE — 83036 HEMOGLOBIN GLYCOSYLATED A1C: CPT | Mod: QW

## 2022-05-09 NOTE — ASSESSMENT
[FreeTextEntry1] : increase metoprolol to 50mg/day\par limited labs\par spec f/u\par pt still deferring full anticoag
Review of Systems:  	•	CONSTITUTIONAL: no fever, no diaphoresis, no chills  	•	SKIN: no rash  	•	RESPIRATORY: no shortness of breath, no cough  	•	CARDIAC: no chest pain, no palpitations  	•	GI: +l flank pain, +nausea. no vomiting, no diarrhea   	•	GENITO-URINARY: no dysuria; no hematuria, no increased urinary frequency  	•	MUSCULOSKELETAL: no joint paint, no swelling, no redness  	•	NEUROLOGIC: no weakness, no syncope

## 2022-05-09 NOTE — HISTORY OF PRESENT ILLNESS
[FreeTextEntry1] : cpx [de-identified] : cpx\par reviewed endo notes--bump in blood sugar\par added jardiance, metformin\par metoprolol, losartan hct\par UTD with optho

## 2022-05-09 NOTE — PHYSICAL EXAM
[No Acute Distress] : no acute distress [Well Nourished] : well nourished [Well Developed] : well developed [Well-Appearing] : well-appearing [Normal Sclera/Conjunctiva] : normal sclera/conjunctiva [PERRL] : pupils equal round and reactive to light [EOMI] : extraocular movements intact [Normal Outer Ear/Nose] : the outer ears and nose were normal in appearance [Normal Oropharynx] : the oropharynx was normal [No JVD] : no jugular venous distention [No Lymphadenopathy] : no lymphadenopathy [Supple] : supple [Thyroid Normal, No Nodules] : the thyroid was normal and there were no nodules present [No Respiratory Distress] : no respiratory distress  [No Accessory Muscle Use] : no accessory muscle use [Clear to Auscultation] : lungs were clear to auscultation bilaterally [Normal Rate] : normal rate  [Regular Rhythm] : with a regular rhythm [Normal S1, S2] : normal S1 and S2 [No Murmur] : no murmur heard [Normal] : normal rate, regular rhythm, normal S1 and S2 and no murmur heard [No Carotid Bruits] : no carotid bruits [No Abdominal Bruit] : a ~M bruit was not heard ~T in the abdomen [No Varicosities] : no varicosities [Pedal Pulses Present] : the pedal pulses are present [No Edema] : there was no peripheral edema [No Palpable Aorta] : no palpable aorta [No Extremity Clubbing/Cyanosis] : no extremity clubbing/cyanosis [Soft] : abdomen soft [Non Tender] : non-tender [Non-distended] : non-distended [No Masses] : no abdominal mass palpated [No HSM] : no HSM [Normal Bowel Sounds] : normal bowel sounds [Normal Posterior Cervical Nodes] : no posterior cervical lymphadenopathy [Normal Anterior Cervical Nodes] : no anterior cervical lymphadenopathy [No CVA Tenderness] : no CVA  tenderness [No Spinal Tenderness] : no spinal tenderness [No Joint Swelling] : no joint swelling [Grossly Normal Strength/Tone] : grossly normal strength/tone [No Rash] : no rash [Coordination Grossly Intact] : coordination grossly intact [No Focal Deficits] : no focal deficits [Normal Gait] : normal gait [Deep Tendon Reflexes (DTR)] : deep tendon reflexes were 2+ and symmetric [Normal Affect] : the affect was normal [Normal Insight/Judgement] : insight and judgment were intact [Declined Rectal Exam] : declined rectal exam

## 2022-05-10 LAB
CHOLEST SERPL-MCNC: 159 MG/DL
HDLC SERPL-MCNC: 34 MG/DL
LDLC SERPL CALC-MCNC: 58 MG/DL
NONHDLC SERPL-MCNC: 126 MG/DL
PSA SERPL-MCNC: 0.21 NG/ML
T3 SERPL-MCNC: 148 NG/DL
T4 FREE SERPL-MCNC: 1.5 NG/DL
TRIGL SERPL-MCNC: 340 MG/DL
TSH SERPL-ACNC: <0.01 UIU/ML

## 2022-05-12 LAB — TSI ACT/NOR SER: 1.42 IU/L

## 2022-05-13 LAB — TSH RECEPTOR AB: 6.19 IU/L

## 2022-05-15 NOTE — ASSESSMENT
[Diabetes Foot Care] : diabetes foot care [Carbohydrate Consistent Diet] : carbohydrate consistent diet [Long Term Vascular Complications] : long term vascular complications of diabetes [Importance of Diet and Exercise] : importance of diet and exercise to improve glycemic control, achieve weight loss and improve cardiovascular health [Hypoglycemia Management] : hypoglycemia management [Self Monitoring of Blood Glucose] : self monitoring of blood glucose [Retinopathy Screening] : Patient was referred to ophthalmology for retinopathy screening [Methimazole Therapy/PTU Therapy] : Risks and benefits of methimazole therapy/PTU therapy were discussed with the patient, including rash, liver dysfunction, and agranulocytosis. Patient was instructed to call the office for flu-like symptoms (e.g. fever and sore-throat) [Diabetic Medications] : Risks and benefits of diabetic medications were discussed [FreeTextEntry1] : 1.h/o recent diagnosis of hyperthyroidism, etiology is Graves hyperthyroidism \par Noted +TSI Ab in March 2022\par Not on therapy currently\par Clinically euthyroid.\par Bloodwork was collected in office today for repeat TFTs, will obtain antibodies including TSH receptor AB\par Discussed Methimazole therapy with patient today, will f/u results accordingly \par \par 2. Type 2 DM\par Diagnosed 5 yrs ago\par Notes polyuria 3-4x/day.\par Increase Metformin to 1000mg po BID\par Discussed indications, benefits and potential side effects of SGLT2i, patient agreed to try.\par Start Jardiance 10mg daily\par \par Answered all questions today; patient verbalized understanding of the above. \par RTC in 6 weeks.

## 2022-05-15 NOTE — HISTORY OF PRESENT ILLNESS
[FreeTextEntry1] : CICI CARTER  is a 57 yo male  with past medical history of HTN, Atrial fibrillation, type 2 DM, who presents for management of hyperthyroidism\par \par Patient admits to heat intolerance, denies dyspnea, dysphagia, dysphonia, denies changes in bowel habits including diarrhea or constipation. Admits 10lbs weight loss. He also denies anxiety, palpitations, tremors/shaking of extremities or insomnia. No recent h/o infections, notes he had COVID infection in 2020, and he noted to recover in terms of symptoms afterwards.\par \par Regarding type 2 DM, he was diagnosed 5 years ago, no h/o known complications. He last saw ophthalmology 1 month ago, no h/o retinopathy. Denies h/o CKD, MI or CVA. Taking Metformin 500mg po TID\par He notes typical diet:\par breakfast: cereal w/ milk, tea w/o honey or sugar\par lunch: cheese and ham sandwich with honey mustard\par dinner: chicken, steak about 1x/week, \par dessert: rarely \par He is checking sugars 1x/day, noted it ranged 200- 280 since last 2 months. He states he didn’t skip metformin doses. He admits running to bathroom, denies polydipsia. \par \par PMH:as noted above\par PSH: appendectomy\par Family Hx: h/o DM- father\par Social Hx: denies ETOH, tobacco or illicit drugs se.\par ALL: NKDA\par Home Meds: Losartan-CHTZ 50-12.5mg daily, Metformin 500mg po TID, ac, viagra prn

## 2022-05-19 ENCOUNTER — APPOINTMENT (OUTPATIENT)
Dept: INTERNAL MEDICINE | Facility: CLINIC | Age: 57
End: 2022-05-19
Payer: COMMERCIAL

## 2022-05-19 DIAGNOSIS — R42 DIZZINESS AND GIDDINESS: ICD-10-CM

## 2022-05-19 DIAGNOSIS — R21 RASH AND OTHER NONSPECIFIC SKIN ERUPTION: ICD-10-CM

## 2022-05-19 PROCEDURE — 99441: CPT

## 2022-06-09 ENCOUNTER — APPOINTMENT (OUTPATIENT)
Dept: INTERNAL MEDICINE | Facility: CLINIC | Age: 57
End: 2022-06-09
Payer: COMMERCIAL

## 2022-06-09 ENCOUNTER — APPOINTMENT (OUTPATIENT)
Dept: ENDOCRINOLOGY | Facility: CLINIC | Age: 57
End: 2022-06-09

## 2022-06-09 ENCOUNTER — TRANSCRIPTION ENCOUNTER (OUTPATIENT)
Age: 57
End: 2022-06-09

## 2022-06-09 VITALS — WEIGHT: 230 LBS | BODY MASS INDEX: 31.19 KG/M2

## 2022-06-09 VITALS
DIASTOLIC BLOOD PRESSURE: 85 MMHG | SYSTOLIC BLOOD PRESSURE: 135 MMHG | HEIGHT: 72 IN | TEMPERATURE: 98.4 F | HEART RATE: 75 BPM | OXYGEN SATURATION: 95 %

## 2022-06-09 VITALS — HEART RATE: 102 BPM

## 2022-06-09 DIAGNOSIS — R14.0 ABDOMINAL DISTENSION (GASEOUS): ICD-10-CM

## 2022-06-09 DIAGNOSIS — D69.6 THROMBOCYTOPENIA, UNSPECIFIED: ICD-10-CM

## 2022-06-09 DIAGNOSIS — R63.5 ABNORMAL WEIGHT GAIN: ICD-10-CM

## 2022-06-09 PROCEDURE — 99214 OFFICE O/P EST MOD 30 MIN: CPT | Mod: 25

## 2022-06-09 PROCEDURE — 36415 COLL VENOUS BLD VENIPUNCTURE: CPT

## 2022-06-09 NOTE — HISTORY OF PRESENT ILLNESS
[FreeTextEntry1] : leg edema [de-identified] : few days of leg edema and abd swelling\par on metformin, metoprolol, losartan/hctz\par no dyspnea or abd pain or fever

## 2022-06-09 NOTE — PHYSICAL EXAM
[Normal] : no respiratory distress, lungs were clear to auscultation bilaterally and no accessory muscle use [Normal S1, S2] : normal S1 and S2 [de-identified] : irreg irreg [de-identified] : 1+ lower extremity edema [de-identified] : mild lower abd discomfort--mild distention

## 2022-06-10 LAB
ALBUMIN SERPL ELPH-MCNC: 3.6 G/DL
ALP BLD-CCNC: 93 U/L
ALT SERPL-CCNC: 38 U/L
ANION GAP SERPL CALC-SCNC: 15 MMOL/L
AST SERPL-CCNC: 45 U/L
BASOPHILS # BLD AUTO: 0.01 K/UL
BASOPHILS NFR BLD AUTO: 0.2 %
BILIRUB SERPL-MCNC: 1.6 MG/DL
BUN SERPL-MCNC: 6 MG/DL
CALCIUM SERPL-MCNC: 8.5 MG/DL
CHLORIDE SERPL-SCNC: 104 MMOL/L
CO2 SERPL-SCNC: 22 MMOL/L
CREAT SERPL-MCNC: 0.53 MG/DL
EGFR: 117 ML/MIN/1.73M2
EOSINOPHIL # BLD AUTO: 0.1 K/UL
EOSINOPHIL NFR BLD AUTO: 2.2 %
GLUCOSE SERPL-MCNC: 95 MG/DL
HCT VFR BLD CALC: 42.1 %
HGB BLD-MCNC: 14 G/DL
IMM GRANULOCYTES NFR BLD AUTO: 0.2 %
INR PPP: 1.17 RATIO
LYMPHOCYTES # BLD AUTO: 1.83 K/UL
LYMPHOCYTES NFR BLD AUTO: 40.9 %
MAN DIFF?: NORMAL
MCHC RBC-ENTMCNC: 32.8 PG
MCHC RBC-ENTMCNC: 33.3 GM/DL
MCV RBC AUTO: 98.6 FL
MONOCYTES # BLD AUTO: 0.68 K/UL
MONOCYTES NFR BLD AUTO: 15.2 %
NEUTROPHILS # BLD AUTO: 1.84 K/UL
NEUTROPHILS NFR BLD AUTO: 41.3 %
NT-PROBNP SERPL-MCNC: 295 PG/ML
PLATELET # BLD AUTO: 70 K/UL
POTASSIUM SERPL-SCNC: 3.8 MMOL/L
PROT SERPL-MCNC: 6.3 G/DL
PT BLD: 13.6 SEC
RBC # BLD: 4.27 M/UL
RBC # FLD: 15.5 %
SODIUM SERPL-SCNC: 140 MMOL/L
WBC # FLD AUTO: 4.47 K/UL

## 2022-06-14 RX ORDER — LOSARTAN POTASSIUM AND HYDROCHLOROTHIAZIDE 12.5; 5 MG/1; MG/1
50-12.5 TABLET ORAL
Qty: 90 | Refills: 1 | Status: DISCONTINUED | COMMUNITY
Start: 2019-04-09 | End: 2022-06-14

## 2022-06-14 RX ORDER — HYDROCHLOROTHIAZIDE 12.5 MG/1
12.5 TABLET ORAL
Qty: 90 | Refills: 0 | Status: DISCONTINUED | COMMUNITY
Start: 2019-07-12 | End: 2022-06-14

## 2022-06-23 ENCOUNTER — APPOINTMENT (OUTPATIENT)
Dept: ENDOCRINOLOGY | Facility: CLINIC | Age: 57
End: 2022-06-23

## 2022-06-29 ENCOUNTER — NON-APPOINTMENT (OUTPATIENT)
Age: 57
End: 2022-06-29

## 2022-07-14 ENCOUNTER — APPOINTMENT (OUTPATIENT)
Dept: HEPATOLOGY | Facility: CLINIC | Age: 57
End: 2022-07-14

## 2022-07-14 ENCOUNTER — LABORATORY RESULT (OUTPATIENT)
Age: 57
End: 2022-07-14

## 2022-07-14 VITALS
OXYGEN SATURATION: 98 % | BODY MASS INDEX: 28.35 KG/M2 | DIASTOLIC BLOOD PRESSURE: 79 MMHG | WEIGHT: 209 LBS | HEART RATE: 132 BPM | TEMPERATURE: 98 F | SYSTOLIC BLOOD PRESSURE: 122 MMHG

## 2022-07-14 DIAGNOSIS — K76.9 LIVER DISEASE, UNSPECIFIED: ICD-10-CM

## 2022-07-14 PROCEDURE — 99204 OFFICE O/P NEW MOD 45 MIN: CPT

## 2022-07-14 RX ORDER — FUROSEMIDE 20 MG/1
20 TABLET ORAL
Qty: 30 | Refills: 0 | Status: DISCONTINUED | COMMUNITY
Start: 2022-06-09 | End: 2022-07-14

## 2022-07-14 RX ORDER — TESTOSTERONE 50 MG/5G
50 MG/5GM GEL TRANSDERMAL
Qty: 30 | Refills: 0 | Status: DISCONTINUED | COMMUNITY
Start: 2020-12-08 | End: 2022-07-14

## 2022-07-14 RX ORDER — MECLIZINE HYDROCHLORIDE 12.5 MG/1
12.5 TABLET ORAL
Qty: 20 | Refills: 0 | Status: DISCONTINUED | COMMUNITY
Start: 2022-05-19 | End: 2022-07-14

## 2022-07-14 RX ORDER — METHIMAZOLE 5 MG/1
5 TABLET ORAL DAILY
Qty: 30 | Refills: 1 | Status: DISCONTINUED | COMMUNITY
Start: 2022-05-16 | End: 2022-07-14

## 2022-07-14 RX ORDER — NYSTATIN 100000 [USP'U]/G
100000 CREAM TOPICAL TWICE DAILY
Qty: 1 | Refills: 0 | Status: DISCONTINUED | COMMUNITY
Start: 2022-05-19 | End: 2022-07-14

## 2022-07-14 RX ORDER — CEPHALEXIN 500 MG/1
500 CAPSULE ORAL
Qty: 21 | Refills: 0 | Status: DISCONTINUED | COMMUNITY
Start: 2022-06-07

## 2022-07-14 RX ORDER — METAXALONE 800 MG/1
800 TABLET ORAL TWICE DAILY
Qty: 14 | Refills: 0 | Status: DISCONTINUED | COMMUNITY
Start: 2021-10-28 | End: 2022-07-14

## 2022-07-14 RX ORDER — EMPAGLIFLOZIN 10 MG/1
10 TABLET, FILM COATED ORAL DAILY
Qty: 90 | Refills: 0 | Status: DISCONTINUED | COMMUNITY
Start: 2022-05-09 | End: 2022-07-14

## 2022-07-14 RX ORDER — METOPROLOL SUCCINATE 25 MG/1
25 TABLET, EXTENDED RELEASE ORAL
Qty: 30 | Refills: 0 | Status: DISCONTINUED | COMMUNITY
Start: 2022-03-18 | End: 1900-01-01

## 2022-07-14 RX ORDER — ASPIRIN ENTERIC COATED TABLETS 81 MG 81 MG/1
81 TABLET, DELAYED RELEASE ORAL
Refills: 0 | Status: ACTIVE | COMMUNITY

## 2022-07-15 ENCOUNTER — NON-APPOINTMENT (OUTPATIENT)
Age: 57
End: 2022-07-15

## 2022-07-15 LAB
A1AT SERPL-MCNC: 165 MG/DL
AFP-TM SERPL-MCNC: 3.9 NG/ML
ALBUMIN SERPL ELPH-MCNC: 4 G/DL
ALP BLD-CCNC: 100 U/L
ALT SERPL-CCNC: 29 U/L
ANION GAP SERPL CALC-SCNC: 14 MMOL/L
AST SERPL-CCNC: 37 U/L
BASOPHILS # BLD AUTO: 0.03 K/UL
BASOPHILS NFR BLD AUTO: 0.5 %
BILIRUB DIRECT SERPL-MCNC: 0.5 MG/DL
BILIRUB SERPL-MCNC: 1.9 MG/DL
BUN SERPL-MCNC: 4 MG/DL
CALCIUM SERPL-MCNC: 8.8 MG/DL
CANCER AG19-9 SERPL-ACNC: 32 U/ML
CERULOPLASMIN SERPL-MCNC: 25 MG/DL
CHLORIDE SERPL-SCNC: 104 MMOL/L
CO2 SERPL-SCNC: 26 MMOL/L
CREAT SERPL-MCNC: 0.64 MG/DL
EGFR: 110 ML/MIN/1.73M2
EOSINOPHIL # BLD AUTO: 0.11 K/UL
EOSINOPHIL NFR BLD AUTO: 1.9 %
FERRITIN SERPL-MCNC: 1449 NG/ML
FOLATE SERPL-MCNC: 10.6 NG/ML
GGT SERPL-CCNC: 129 U/L
HBV CORE IGG+IGM SER QL: NONREACTIVE
HBV SURFACE AB SER QL: NONREACTIVE
HBV SURFACE AG SER QL: NONREACTIVE
HCT VFR BLD CALC: 44.8 %
HCV AB SER QL: NONREACTIVE
HCV S/CO RATIO: 0.14 S/CO
HEPATITIS A IGG ANTIBODY: REACTIVE
HGB BLD-MCNC: 14.4 G/DL
IGA SER QL IEP: 578 MG/DL
IGG SER QL IEP: 1667 MG/DL
IGM SER QL IEP: 108 MG/DL
IMM GRANULOCYTES NFR BLD AUTO: 0.2 %
INR PPP: 1.17 RATIO
IRON SATN MFR SERPL: NORMAL %
IRON SERPL-MCNC: 238 UG/DL
LYMPHOCYTES # BLD AUTO: 2.04 K/UL
LYMPHOCYTES NFR BLD AUTO: 35.1 %
MAN DIFF?: NORMAL
MCHC RBC-ENTMCNC: 32.1 GM/DL
MCHC RBC-ENTMCNC: 32.1 PG
MCV RBC AUTO: 100 FL
MONOCYTES # BLD AUTO: 0.77 K/UL
MONOCYTES NFR BLD AUTO: 13.2 %
NEUTROPHILS # BLD AUTO: 2.86 K/UL
NEUTROPHILS NFR BLD AUTO: 49.1 %
PLATELET # BLD AUTO: 90 K/UL
POTASSIUM SERPL-SCNC: 3.8 MMOL/L
PROT SERPL-MCNC: 7.1 G/DL
PT BLD: 13.6 SEC
RBC # BLD: 4.48 M/UL
RBC # FLD: 13.7 %
SODIUM SERPL-SCNC: 144 MMOL/L
TIBC SERPL-MCNC: NORMAL UG/DL
UIBC SERPL-MCNC: <20 UG/DL
VIT B12 SERPL-MCNC: 402 PG/ML
WBC # FLD AUTO: 5.82 K/UL

## 2022-07-15 NOTE — HISTORY OF PRESENT ILLNESS
[de-identified] : Dennis Hendricks 56 yoM here for evaluation of cirrhosis referred by his PCP Dr. Blake Tao. Reports that about 15 yrs ago there was "something with his LFTs" and was seen by a physician who performed abdo US in the office and advised him to cut back on alcohol intakes.  2 months ago he developed increased in abdominal girth and swollen legs for the first time and was started on furosemide with improvement. Never had paracentesis. He had abdo CT w/o contrast on 6/13/22 showing moderate ascites, steatosis, unable to evaluate for liver lesion, 3.1 X 1.6 CM calcified lesion at the pancreatic tail. This was follow up with MRI on 6/20/22 no hepatic lesions, probable cirrhotic morphology, hepatic steatosis, moderate ascites, signs of portal HTN including splenomegaly and varices. There is a  3.1 X 1.87 CM pancreatic calcified lesion not associated with an enhancing masslike soft tissue. His BW showed mostly normal liver enzymes with occasional mild elevated AST with a rise in TB of 1.3 and 1.6 in 2022. Albumin levels normal. INR was 1.17 on 6/9/22 not on anticoag. \par \par Feels well. Mental status is clear. Denies abdominal pain, nausea, vomiting, melena, hematochezia, or hematemesis. LLE resolved and abdomen no longer distended after taking furosemide. \par \par Started drinking alcohol in his early 20's and drank more in the past. Now drinking few beers a day.  \par \par Reports had colonoscopy about 1 1/2 year ago with polyps and was advised to repeat in 3 yrs. \par

## 2022-07-15 NOTE — PHYSICAL EXAM
[Scleral Icterus] : No Scleral Icterus [Ascites Fluid Wave] : no ascites fluid wave [Ascites Tense] : ascites is not tense [Asterixis] : no asterixis observed [Jaundice] : No jaundice [Palmar Erythema] : no Palmar Erythema [General Appearance - Alert] : alert [General Appearance - In No Acute Distress] : in no acute distress [Sclera] : the sclera and conjunctiva were normal [Neck Appearance] : the appearance of the neck was normal [Jugular Venous Distention Increased] : there was no jugular-venous distention [] : no respiratory distress [Respiration, Rhythm And Depth] : normal respiratory rhythm and effort [Auscultation Breath Sounds / Voice Sounds] : lungs were clear to auscultation bilaterally [Heart Rate And Rhythm] : heart rate was normal and rhythm regular [Heart Sounds] : normal S1 and S2 [Edema] : there was no peripheral edema [Bowel Sounds] : normal bowel sounds [Abdomen Tenderness] : non-tender [Abdomen Soft] : soft [Abdomen Hernia] : no hernia was discovered [Nail Clubbing] : no clubbing  or cyanosis of the fingernails [Skin Turgor] : normal skin turgor [Oriented To Time, Place, And Person] : oriented to person, place, and time

## 2022-07-15 NOTE — ASSESSMENT
[FreeTextEntry1] : Dennis Hendricks 56 yoM here for evaluation of cirrhosis. Explained that he has developed decompensated cirrhosis likely due to \par \par #Cirrhosis\par - Explained that he has developed decompensated cirrhosis as evidence by imaging showing portal HTN with ascites.  I have recommended that we do a complete liver evaluation. Will get Fibroscan test. \par -Advised on complete alcohol abstinence. \par -I discussed the meaning of cirrhosis with the patient. I reviewed the natural history of the disease. I explained the risks for the development of esophageal varices with and without bleeding, hepatic encephalopathy, ascites, hepatocellular carcinoma, and liver failure. I have explained that disease can progress to the point of requiring an evaluation for liver transplantation. I have explained the need for imaging every 6 months to screen for liver cancer.\par \par #Cirrhosis Surveillance\par  > Esophageal / gastric varices - Recommended EGD to evaluate for esophageal varices with possible banding. He would like to get this with his GI. \par  > Ascites - MRI 6/20/22 showed moderate ascites. Never had paracentesis. LLE resolved. Ascites seem to have improved with furosemide 40 mg daily. Recommended that he continue for now.  \par  > Hepatic encephalopathy - none on exam today \par  > HCC screening - none on MRI 6/20/22\par  > Portal vein thrombosis - none\par  > Transplant candidate -MELD Na 10 (6/9/22), too low for evaluation. \par \par # Both abdo recent CT and MRI showed a  3.1 X 1.87 CM pancreatic calcified lesion not associated with an enhancing masslike soft tissue. Recommended repeating MRI in 3 months. \par \par #Health maintenance in cirrhosis\par -Will check to see if he needs to be vaccinated for hep A and B\par -Patient was counseled to: abstain from alcohol and all illicit drugs; avoid use of herbal and dietary supplements due to potential hepatotoxicity; limit use of acetaminophen to <2 grams per day; avoid use of nonsteroidal antiinflammatory drugs (NSAIDs) as these can lead to diuretic resistance and precipitate renal dysfunction in patients with advanced liver disease; avoid eating any unpasteurized dairy products; avoid eating any raw or undercooked eggs, fish, poultry, or meat; and avoid eating raw/steamed oysters or other shellfish to avoid risk of Vibrio infection.\par \par Plan:\par BW, fibroscan test, EGD, F/U in 3 weeks for further management. \par \par

## 2022-07-18 LAB
LKM AB SER QL IF: <20.1 UNITS
MITOCHONDRIA AB SER IF-ACNC: NORMAL
SMOOTH MUSCLE AB SER QL IF: NORMAL

## 2022-07-19 ENCOUNTER — NON-APPOINTMENT (OUTPATIENT)
Age: 57
End: 2022-07-19

## 2022-07-19 DIAGNOSIS — R79.89 OTHER SPECIFIED ABNORMAL FINDINGS OF BLOOD CHEMISTRY: ICD-10-CM

## 2022-07-19 LAB
ACE BLD-CCNC: 122 U/L
SOLUBLE LIVER IGG SER IA-ACNC: 0.6
TTG IGA SER IA-ACNC: 1.4 U/ML
TTG IGA SER-ACNC: NEGATIVE

## 2022-07-20 ENCOUNTER — APPOINTMENT (OUTPATIENT)
Dept: HEPATOLOGY | Facility: CLINIC | Age: 57
End: 2022-07-20

## 2022-07-21 LAB
ANA PAT FLD IF-IMP: ABNORMAL
ANA SER IF-ACNC: ABNORMAL

## 2022-07-22 LAB — PHOSPHATIDYETHANOL (PETH), WHOLE BLOOD: 676 NG/ML

## 2022-07-25 ENCOUNTER — APPOINTMENT (OUTPATIENT)
Dept: HEPATOLOGY | Facility: CLINIC | Age: 57
End: 2022-07-25

## 2022-07-25 ENCOUNTER — APPOINTMENT (OUTPATIENT)
Dept: ENDOCRINOLOGY | Facility: CLINIC | Age: 57
End: 2022-07-25

## 2022-07-25 VITALS
TEMPERATURE: 98.1 F | HEIGHT: 72 IN | SYSTOLIC BLOOD PRESSURE: 130 MMHG | RESPIRATION RATE: 16 BRPM | DIASTOLIC BLOOD PRESSURE: 100 MMHG | HEART RATE: 98 BPM | WEIGHT: 205 LBS | OXYGEN SATURATION: 96 % | BODY MASS INDEX: 27.77 KG/M2

## 2022-07-25 DIAGNOSIS — Z23 ENCOUNTER FOR IMMUNIZATION: ICD-10-CM

## 2022-07-25 DIAGNOSIS — F10.10 ALCOHOL ABUSE, UNCOMPLICATED: ICD-10-CM

## 2022-07-25 PROCEDURE — 90739 HEPB VACC 2/4 DOSE ADULT IM: CPT

## 2022-07-25 PROCEDURE — 90471 IMMUNIZATION ADMIN: CPT

## 2022-07-25 PROCEDURE — 91200 LIVER ELASTOGRAPHY: CPT

## 2022-07-26 PROBLEM — F10.10 ALCOHOL ABUSE: Status: ACTIVE | Noted: 2022-07-20

## 2022-07-28 ENCOUNTER — APPOINTMENT (OUTPATIENT)
Dept: CT IMAGING | Facility: CLINIC | Age: 57
End: 2022-07-28

## 2022-08-01 ENCOUNTER — APPOINTMENT (OUTPATIENT)
Dept: HEPATOLOGY | Facility: CLINIC | Age: 57
End: 2022-08-01

## 2022-08-29 ENCOUNTER — APPOINTMENT (OUTPATIENT)
Dept: HEPATOLOGY | Facility: CLINIC | Age: 57
End: 2022-08-29

## 2022-08-29 ENCOUNTER — APPOINTMENT (OUTPATIENT)
Dept: CARDIOLOGY | Facility: CLINIC | Age: 57
End: 2022-08-29

## 2022-08-29 ENCOUNTER — NON-APPOINTMENT (OUTPATIENT)
Age: 57
End: 2022-08-29

## 2022-08-29 VITALS
OXYGEN SATURATION: 96 % | HEIGHT: 72 IN | TEMPERATURE: 98 F | HEART RATE: 87 BPM | BODY MASS INDEX: 28.99 KG/M2 | SYSTOLIC BLOOD PRESSURE: 127 MMHG | WEIGHT: 214 LBS | DIASTOLIC BLOOD PRESSURE: 97 MMHG

## 2022-08-29 PROCEDURE — 99214 OFFICE O/P EST MOD 30 MIN: CPT | Mod: 25

## 2022-08-29 PROCEDURE — 93000 ELECTROCARDIOGRAM COMPLETE: CPT

## 2022-08-29 NOTE — HISTORY OF PRESENT ILLNESS
[FreeTextEntry1] : 56M w/ DM, HTN afib, presents for f/u\par PMD: Dr Blake douglas\par \par Previously,\par pt seen in cardiology for an initial eval 11/2020, with c/o palpitations, and low energy. TTE done at that time was grossly unremarkable. EKG without arrhythmia, and calcium score was 14. \par pt last seen 3/22, feeling well. ekg showing afib ?new. pt not interested in a/c. \par \par pt now presents for follow up. \par today,\par \par pt remains in afib. denies cp sob palpitations, dizziness diaphoresis. syncope. \par pt started on a diuretic by pmd for LE edema. \par \par pt on asa 81. \par \par pt agreeable to eps eval for dccv\par \par \par pt states he can walk several miles without difficulty. 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 pt state he feels run down. low energy level over the past few months. \par \par EKG today showing afib \par \par Prior cardiac workup: none\par Recent labs: 11/2020: tot chol 157 tg 192 hdl 46 ldl 72 Cr 0.83 GFR 98 a1c 6.7\par \par Med hx: DM2 HTN\par Sx hx: appendectomy\par Fam hx: no known cardiac hx. \par Social hx: lives in [place] with wife and 2 kids. 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 \par Meds: losartan-hctz 50-12.5 metformin, viagra prn\par Allergies: nkda\par

## 2022-08-29 NOTE — DISCUSSION/SUMMARY
[FreeTextEntry1] : 56M w/ DM, HTN afib, presents for f/u\par \par 1. HTN\par -controlled\par -cont losartan-hctz 50-12.5\par \par 2. Afib\par -ekg today again showing afib\par -asymptomatic, euvolemic\par -chadsvasc score 1-2, pt on asa\par -pt agreeable to eps eval for possible maggie/dccv\par -will reach out to ep (Dr Martine BURNS)\par \par f/u 4 months\par \par time spent on complete encounter >40 min\par \par \par \par \par >45 min spent on complete encounter. [EKG obtained to assist in diagnosis and management of assessed problem(s)] : EKG obtained to assist in diagnosis and management of assessed problem(s)

## 2022-08-29 NOTE — PHYSICAL EXAM
[Well Developed] : well developed [Well Nourished] : well nourished [No Acute Distress] : no acute distress [Normal Venous Pressure] : normal venous pressure [Normal S1, S2] : normal S1, S2 [No Murmur] : no murmur [Clear Lung Fields] : clear lung fields [Good Air Entry] : good air entry [No Respiratory Distress] : no respiratory distress  [Soft] : abdomen soft [Normal Gait] : normal gait [No Edema] : no edema [Moves all extremities] : moves all extremities [No Focal Deficits] : no focal deficits [Alert and Oriented] : alert and oriented

## 2022-09-06 ENCOUNTER — APPOINTMENT (OUTPATIENT)
Dept: CARDIOLOGY | Facility: CLINIC | Age: 57
End: 2022-09-06

## 2022-09-06 ENCOUNTER — RX RENEWAL (OUTPATIENT)
Age: 57
End: 2022-09-06

## 2022-09-15 ENCOUNTER — APPOINTMENT (OUTPATIENT)
Dept: ELECTROPHYSIOLOGY | Facility: CLINIC | Age: 57
End: 2022-09-15

## 2022-09-19 ENCOUNTER — NON-APPOINTMENT (OUTPATIENT)
Age: 57
End: 2022-09-19

## 2022-09-19 ENCOUNTER — APPOINTMENT (OUTPATIENT)
Dept: ELECTROPHYSIOLOGY | Facility: CLINIC | Age: 57
End: 2022-09-19

## 2022-09-19 VITALS
SYSTOLIC BLOOD PRESSURE: 94 MMHG | HEIGHT: 72 IN | WEIGHT: 208 LBS | OXYGEN SATURATION: 97 % | BODY MASS INDEX: 28.17 KG/M2 | DIASTOLIC BLOOD PRESSURE: 67 MMHG | HEART RATE: 154 BPM

## 2022-09-19 DIAGNOSIS — R00.2 PALPITATIONS: ICD-10-CM

## 2022-09-19 PROCEDURE — 93000 ELECTROCARDIOGRAM COMPLETE: CPT

## 2022-09-19 PROCEDURE — 99205 OFFICE O/P NEW HI 60 MIN: CPT | Mod: 25

## 2022-09-19 RX ORDER — AMOXICILLIN AND CLAVULANATE POTASSIUM 500; 125 MG/1; MG/1
500-125 TABLET, FILM COATED ORAL
Qty: 14 | Refills: 0 | Status: DISCONTINUED | COMMUNITY
Start: 2022-09-15

## 2022-09-19 RX ORDER — MUPIROCIN 20 MG/G
2 OINTMENT TOPICAL
Qty: 22 | Refills: 0 | Status: DISCONTINUED | COMMUNITY
Start: 2022-09-15

## 2022-09-19 NOTE — CARDIOLOGY SUMMARY
[de-identified] : 6/13/2022: mild mitral insufficiency, mild tricuspid insufficiency, moderate left atrial dilation, mild LV dilation and septal hypokinesis with LVEF 40%, possible left pleural effusion \par \par 11/2020: Summary:\par 1.Left ventricular ejection fraction, by visual estimation, is 55 to 60%.\par 2.Normal global left ventricular systolic function.\par 3.Increased relative wall thickness with normal mass index consistent with left ventricular \par concentric remodeling.\par 4.Normal right ventricular size and function.\par 5.Mildly enlarged left atrium.\par 6.Mild mitral valve regurgitation.\par 7.Thickening of the anterior mitral valve leaflet.\par 8.Sclerotic aortic valve with normal opening.\par 9.There is mild  aortic root calcification.

## 2022-09-19 NOTE — DISCUSSION/SUMMARY
[EKG obtained to assist in diagnosis and management of assessed problem(s)] : EKG obtained to assist in diagnosis and management of assessed problem(s) [FreeTextEntry1] : Impression:\par \par 1. Persistent afib: EKG performed today to assess for presence of recurrent afib and reveals afib with RVR to the 150s. Not taking his metoprolol regularly. Last ECHO with LVEF 40%, LA 50mm. Discussed treatment options for afib including rate control vs antiarrhythmics vs possible ablation. Given persistent symptomatic afib for less than 12 months despite rate control management and preference to avoid long term antiarrhythmics, recommend undergoing possible afib ablation. Risks, benefits, and alternatives to procedure discussed at length. Risks including that of bleeding, infection, stroke, and cardiac tamponade discussed and he verbalizes understanding of all. Will hold all medications the morning of the ablation. May take all regularly scheduled medications the night before. Will require JOSI prior to procedure. Will initiate Eliuqis at this time, Rx provided, for thromboembolic prophylaxis. \par \par 2. HTN: BP low in office, likely secondary to the afib with RVR. Feels diaphoretic in office, resolved with sitting; resume oral antihypertensives as prescribed. Encouraged heart healthy diet, sodium restriction, and weight loss. Continue regular f/u with Cardiologist for further HTN management.\par \par 3. NICM: most recent ECHO in setting of afib with LVEF now 40%. Prior ECHO in 2020 with normal LVEF. Suspect tachy/arrhythmia induced. Resume OMT as prescribed, encouraged heart healthy diet, daily weight, and regular f/u with Cardiology/Heart failure team as scheduled. Plan for repeat ECHO once NSR is established. \par \par Plan for afib ablation and RTO for f/u post procedure.

## 2022-09-19 NOTE — HISTORY OF PRESENT ILLNESS
[FreeTextEntry1] : Dennis Hendricks is a 57y/o man with Hx of HTN, DMII, and persistent afib who presents today for initial evaluation. First noted afib back in March 2022 and has remained in afib since that time. ECHO in 2020 with normal LVEF and now more recent ECHO 2022 with worsening LVEF, now 40%. Suspect tachy/arrhythmia induced. Does feel palpitations at time and some exertional fatigue. Has woken from sleep feeling a little dizzy as well. Unsure if he has been taking metoprolol. Not on AC. Denies chest pain, SOB, syncope or near syncope.

## 2022-10-06 ENCOUNTER — NON-APPOINTMENT (OUTPATIENT)
Age: 57
End: 2022-10-06

## 2022-10-08 ENCOUNTER — NON-APPOINTMENT (OUTPATIENT)
Age: 57
End: 2022-10-08

## 2022-10-11 ENCOUNTER — APPOINTMENT (OUTPATIENT)
Dept: ENDOCRINOLOGY | Facility: CLINIC | Age: 57
End: 2022-10-11

## 2022-10-11 NOTE — ASU PATIENT PROFILE, ADULT - FALL HARM RISK - CONCLUSION
Pt to rm 38 from triage and triaged at bedside for complaint of n/d and left ear pain and sore throat Universal Safety Interventions

## 2022-10-11 NOTE — HISTORY OF PRESENT ILLNESS
[FreeTextEntry1] : This is a 56 yo male who presents for follow up\par \par \par At last endo visit iin May 2022, notye dGraves hyperthyroidism and was clinicallyo wuthyroid, and started on Methimazole 5mg daily. \par For DM, increased Meetformin 1000mg po BID and started on Jardiance, shortly after this visit, discuontineud Jardiance due to urination\par \par \par

## 2022-10-12 ENCOUNTER — APPOINTMENT (OUTPATIENT)
Dept: HEPATOLOGY | Facility: HOSPITAL | Age: 57
End: 2022-10-12

## 2022-10-12 ENCOUNTER — RESULT REVIEW (OUTPATIENT)
Age: 57
End: 2022-10-12

## 2022-10-12 ENCOUNTER — OUTPATIENT (OUTPATIENT)
Dept: OUTPATIENT SERVICES | Facility: HOSPITAL | Age: 57
LOS: 1 days | Discharge: HOME | End: 2022-10-12

## 2022-10-12 VITALS
SYSTOLIC BLOOD PRESSURE: 144 MMHG | HEART RATE: 90 BPM | OXYGEN SATURATION: 96 % | DIASTOLIC BLOOD PRESSURE: 105 MMHG | RESPIRATION RATE: 20 BRPM

## 2022-10-12 VITALS
HEART RATE: 96 BPM | OXYGEN SATURATION: 96 % | SYSTOLIC BLOOD PRESSURE: 126 MMHG | DIASTOLIC BLOOD PRESSURE: 91 MMHG | RESPIRATION RATE: 21 BRPM

## 2022-10-12 DIAGNOSIS — K25.9 GASTRIC ULCER, UNSPECIFIED AS ACUTE OR CHRONIC, W/OUT HEMORRHAGE OR PERFORATION: ICD-10-CM

## 2022-10-12 DIAGNOSIS — I86.4 ESOPHAGEAL VARICES W/OUT BLEEDING: ICD-10-CM

## 2022-10-12 DIAGNOSIS — I85.00 ESOPHAGEAL VARICES W/OUT BLEEDING: ICD-10-CM

## 2022-10-12 DIAGNOSIS — K74.60 UNSPECIFIED CIRRHOSIS OF LIVER: ICD-10-CM

## 2022-10-12 LAB — GLUCOSE BLDC GLUCOMTR-MCNC: 116 MG/DL — HIGH (ref 70–99)

## 2022-10-12 PROCEDURE — 43239 EGD BIOPSY SINGLE/MULTIPLE: CPT

## 2022-10-12 PROCEDURE — 88305 TISSUE EXAM BY PATHOLOGIST: CPT | Mod: 26

## 2022-10-12 NOTE — ASSESSMENT
[FreeTextEntry1] : 10/12/22 EGD: one medium to large esophageal varix; small varices. GOV2, small - 11 mm diameter. Portal HTN gastropathy. Erosive gastritis, biopsied. F/u path. Omeprazole x 1 month prescribed. Discuss switching metoprolol to NSBB and stopping lisinopril with his cardiologist.

## 2022-10-13 ENCOUNTER — OUTPATIENT (OUTPATIENT)
Dept: OUTPATIENT SERVICES | Facility: HOSPITAL | Age: 57
LOS: 1 days | End: 2022-10-13

## 2022-10-13 VITALS
WEIGHT: 216.93 LBS | TEMPERATURE: 99 F | SYSTOLIC BLOOD PRESSURE: 130 MMHG | RESPIRATION RATE: 16 BRPM | DIASTOLIC BLOOD PRESSURE: 68 MMHG | HEIGHT: 73 IN | HEART RATE: 88 BPM | OXYGEN SATURATION: 97 %

## 2022-10-13 DIAGNOSIS — I48.19 OTHER PERSISTENT ATRIAL FIBRILLATION: ICD-10-CM

## 2022-10-13 DIAGNOSIS — I10 ESSENTIAL (PRIMARY) HYPERTENSION: ICD-10-CM

## 2022-10-13 DIAGNOSIS — E11.9 TYPE 2 DIABETES MELLITUS WITHOUT COMPLICATIONS: ICD-10-CM

## 2022-10-13 DIAGNOSIS — I48.91 UNSPECIFIED ATRIAL FIBRILLATION: ICD-10-CM

## 2022-10-13 DIAGNOSIS — Z90.49 ACQUIRED ABSENCE OF OTHER SPECIFIED PARTS OF DIGESTIVE TRACT: Chronic | ICD-10-CM

## 2022-10-13 LAB
A1C WITH ESTIMATED AVERAGE GLUCOSE RESULT: 6.6 % — HIGH (ref 4–5.6)
ALBUMIN SERPL ELPH-MCNC: 3.7 G/DL — SIGNIFICANT CHANGE UP (ref 3.3–5)
ALP SERPL-CCNC: 115 U/L — SIGNIFICANT CHANGE UP (ref 40–120)
ALT FLD-CCNC: 37 U/L — SIGNIFICANT CHANGE UP (ref 4–41)
ANION GAP SERPL CALC-SCNC: 10 MMOL/L — SIGNIFICANT CHANGE UP (ref 7–14)
AST SERPL-CCNC: 40 U/L — SIGNIFICANT CHANGE UP (ref 4–40)
BILIRUB SERPL-MCNC: 1.1 MG/DL — SIGNIFICANT CHANGE UP (ref 0.2–1.2)
BLD GP AB SCN SERPL QL: NEGATIVE — SIGNIFICANT CHANGE UP
BUN SERPL-MCNC: 11 MG/DL — SIGNIFICANT CHANGE UP (ref 7–23)
CALCIUM SERPL-MCNC: 9.1 MG/DL — SIGNIFICANT CHANGE UP (ref 8.4–10.5)
CHLORIDE SERPL-SCNC: 103 MMOL/L — SIGNIFICANT CHANGE UP (ref 98–107)
CO2 SERPL-SCNC: 26 MMOL/L — SIGNIFICANT CHANGE UP (ref 22–31)
CREAT SERPL-MCNC: 0.65 MG/DL — SIGNIFICANT CHANGE UP (ref 0.5–1.3)
EGFR: 109 ML/MIN/1.73M2 — SIGNIFICANT CHANGE UP
ESTIMATED AVERAGE GLUCOSE: 143 — SIGNIFICANT CHANGE UP
GLUCOSE SERPL-MCNC: 75 MG/DL — SIGNIFICANT CHANGE UP (ref 70–99)
HCT VFR BLD CALC: 43.4 % — SIGNIFICANT CHANGE UP (ref 39–50)
HGB BLD-MCNC: 14.3 G/DL — SIGNIFICANT CHANGE UP (ref 13–17)
MCHC RBC-ENTMCNC: 32.4 PG — SIGNIFICANT CHANGE UP (ref 27–34)
MCHC RBC-ENTMCNC: 32.9 GM/DL — SIGNIFICANT CHANGE UP (ref 32–36)
MCV RBC AUTO: 98.4 FL — SIGNIFICANT CHANGE UP (ref 80–100)
NRBC # BLD: 0 /100 WBCS — SIGNIFICANT CHANGE UP (ref 0–0)
NRBC # FLD: 0 K/UL — SIGNIFICANT CHANGE UP (ref 0–0)
PLATELET # BLD AUTO: 58 K/UL — LOW (ref 150–400)
POTASSIUM SERPL-MCNC: 4.1 MMOL/L — SIGNIFICANT CHANGE UP (ref 3.5–5.3)
POTASSIUM SERPL-SCNC: 4.1 MMOL/L — SIGNIFICANT CHANGE UP (ref 3.5–5.3)
PROT SERPL-MCNC: 7 G/DL — SIGNIFICANT CHANGE UP (ref 6–8.3)
RBC # BLD: 4.41 M/UL — SIGNIFICANT CHANGE UP (ref 4.2–5.8)
RBC # FLD: 13.5 % — SIGNIFICANT CHANGE UP (ref 10.3–14.5)
RH IG SCN BLD-IMP: POSITIVE — SIGNIFICANT CHANGE UP
SODIUM SERPL-SCNC: 139 MMOL/L — SIGNIFICANT CHANGE UP (ref 135–145)
WBC # BLD: 4.78 K/UL — SIGNIFICANT CHANGE UP (ref 3.8–10.5)
WBC # FLD AUTO: 4.78 K/UL — SIGNIFICANT CHANGE UP (ref 3.8–10.5)

## 2022-10-13 NOTE — H&P PST ADULT - HISTORY OF PRESENT ILLNESS
57 year old male with PMH of HTN, HDL, Type 2 DM, presents to Presurgical testing with diagnosis of persistent A-fib. Now scheduled for A-fib Ablation with [facility] tentatively on 10/18/22. 57 year old male with PMH of HTN, HDL, Type 2 DM, presents to Presurgical testing with diagnosis of persistent A-fib. Now scheduled for A-fib Ablation with [facility] tentatively on 10/18/22.

## 2022-10-13 NOTE — H&P PST ADULT - PROBLEM SELECTOR PLAN 1
Scheduled for A-fib Ablation with [facility] tentatively on 10/18/22  Pre-op instructions provided. Pt given verbal and written instructions with teach back on Pepcid. Pt verbalized understanding with return demonstration.   Pending labs.  Covid testing scheduled prior to surgery

## 2022-10-13 NOTE — H&P PST ADULT - NEGATIVE NEUROLOGICAL SYMPTOMS
no transient paralysis/no weakness/no paresthesias/no focal seizures/no syncope/no tremors/no loss of sensation

## 2022-10-13 NOTE — H&P PST ADULT - HIV STATUS
Pt admitted to [facility] for suspected TIA with ABCD2 score of 5. EKG w/ normal sinus rhythm. CXR w/ no acute cardiopulmonary processes. CT head and CTA head and neck w/ no acute findings. Echo w/ bubble study showed EF 55%, normal systolic and diastolic function, and negative bubble study. Pt remained asymptomatic and stable throughout hospital stay. Per neuro recommendations, transient neurological deficit likely 2/2 increased caffeine intake - continue ASA 81 mg daily and lipitor 40 mg daily upon discharge. Pt informed of plan to continue medications in addition to following up with his PCP for management of his diabetes. Pt expressed understanding of plan and all questions answered. Close return precautions given.  
Negative/Unknown

## 2022-10-13 NOTE — H&P PST ADULT - GENITOURINARY MALE
normal external genitalia/no hernia/no discharge/no mass/no tenderness/no ulcer/normal/no penile lesion/no scrotal mass

## 2022-10-14 ENCOUNTER — NON-APPOINTMENT (OUTPATIENT)
Age: 57
End: 2022-10-14

## 2022-10-14 ENCOUNTER — OUTPATIENT (OUTPATIENT)
Dept: OUTPATIENT SERVICES | Facility: HOSPITAL | Age: 57
LOS: 1 days | End: 2022-10-14

## 2022-10-14 DIAGNOSIS — Z90.49 ACQUIRED ABSENCE OF OTHER SPECIFIED PARTS OF DIGESTIVE TRACT: Chronic | ICD-10-CM

## 2022-10-14 DIAGNOSIS — I48.19 OTHER PERSISTENT ATRIAL FIBRILLATION: ICD-10-CM

## 2022-10-14 LAB
CLOSURE TME COLL+EPINEP BLD: 48 K/UL — LOW (ref 150–400)
SURGICAL PATHOLOGY STUDY: SIGNIFICANT CHANGE UP

## 2022-10-18 ENCOUNTER — OUTPATIENT (OUTPATIENT)
Dept: OUTPATIENT SERVICES | Facility: HOSPITAL | Age: 57
LOS: 1 days | Discharge: HOME | End: 2022-10-18

## 2022-10-18 DIAGNOSIS — I48.19 OTHER PERSISTENT ATRIAL FIBRILLATION: ICD-10-CM

## 2022-10-18 DIAGNOSIS — Z90.49 ACQUIRED ABSENCE OF OTHER SPECIFIED PARTS OF DIGESTIVE TRACT: Chronic | ICD-10-CM

## 2022-10-18 LAB
GLUCOSE BLDC GLUCOMTR-MCNC: 134 MG/DL — HIGH (ref 70–99)
GLUCOSE BLDC GLUCOMTR-MCNC: 144 MG/DL — HIGH (ref 70–99)
HCT VFR BLD CALC: 46.4 % — SIGNIFICANT CHANGE UP (ref 39–50)
HGB BLD-MCNC: 15.7 G/DL — SIGNIFICANT CHANGE UP (ref 13–17)
MCHC RBC-ENTMCNC: 33.4 PG — SIGNIFICANT CHANGE UP (ref 27–34)
MCHC RBC-ENTMCNC: 33.8 GM/DL — SIGNIFICANT CHANGE UP (ref 32–36)
MCV RBC AUTO: 98.7 FL — SIGNIFICANT CHANGE UP (ref 80–100)
NRBC # BLD: 0 /100 WBCS — SIGNIFICANT CHANGE UP (ref 0–0)
NRBC # FLD: 0 K/UL — SIGNIFICANT CHANGE UP (ref 0–0)
PLATELET # BLD AUTO: 63 K/UL — LOW (ref 150–400)
RBC # BLD: 4.7 M/UL — SIGNIFICANT CHANGE UP (ref 4.2–5.8)
RBC # FLD: 13.5 % — SIGNIFICANT CHANGE UP (ref 10.3–14.5)
RH IG SCN BLD-IMP: POSITIVE — SIGNIFICANT CHANGE UP
WBC # BLD: 4.83 K/UL — SIGNIFICANT CHANGE UP (ref 3.8–10.5)
WBC # FLD AUTO: 4.83 K/UL — SIGNIFICANT CHANGE UP (ref 3.8–10.5)

## 2022-10-18 PROCEDURE — 93623 PRGRMD STIMJ&PACG IV RX NFS: CPT | Mod: 26

## 2022-10-18 PROCEDURE — 93656 COMPRE EP EVAL ABLTJ ATR FIB: CPT

## 2022-10-18 PROCEDURE — 93657 TX L/R ATRIAL FIB ADDL: CPT

## 2022-10-18 PROCEDURE — 93010 ELECTROCARDIOGRAM REPORT: CPT

## 2022-10-18 RX ORDER — SODIUM CHLORIDE 9 MG/ML
1000 INJECTION, SOLUTION INTRAVENOUS
Refills: 0 | Status: DISCONTINUED | OUTPATIENT
Start: 2022-10-18 | End: 2022-10-18

## 2022-10-18 RX ORDER — ASPIRIN/CALCIUM CARB/MAGNESIUM 324 MG
1 TABLET ORAL
Qty: 0 | Refills: 0 | DISCHARGE

## 2022-10-18 RX ORDER — AMIODARONE HYDROCHLORIDE 400 MG/1
1 TABLET ORAL
Qty: 0 | Refills: 0 | DISCHARGE

## 2022-10-18 RX ORDER — APIXABAN 2.5 MG/1
1 TABLET, FILM COATED ORAL
Qty: 0 | Refills: 0 | DISCHARGE

## 2022-10-18 RX ORDER — PANTOPRAZOLE SODIUM 20 MG/1
1 TABLET, DELAYED RELEASE ORAL
Qty: 30 | Refills: 0
Start: 2022-10-18 | End: 2022-11-16

## 2022-10-18 RX ORDER — LOSARTAN POTASSIUM 100 MG/1
1 TABLET, FILM COATED ORAL
Qty: 0 | Refills: 0 | DISCHARGE

## 2022-10-18 RX ORDER — METOPROLOL TARTRATE 50 MG
1 TABLET ORAL
Qty: 0 | Refills: 0 | DISCHARGE

## 2022-10-18 RX ORDER — METFORMIN HYDROCHLORIDE 850 MG/1
2 TABLET ORAL
Qty: 0 | Refills: 0 | DISCHARGE

## 2022-10-18 RX ORDER — LOSARTAN/HYDROCHLOROTHIAZIDE 100MG-25MG
1 TABLET ORAL
Qty: 0 | Refills: 0 | DISCHARGE

## 2022-10-18 RX ORDER — FUROSEMIDE 40 MG
1 TABLET ORAL
Qty: 0 | Refills: 0 | DISCHARGE

## 2022-10-18 NOTE — CHART NOTE - NSCHARTNOTEFT_GEN_A_CORE
In brief this is a 58 y/o M with PMH of HTN, DM type II, HLD, Atrial fibrillation(on Eliquis and last dose on 10/17 am) presented to [facility] for Atrial fibrillation ablation. Patient stated that he does not have any typical symptoms of atrial fibrillation(CP, palpitations, SOB or WADE). Patient stated that he did notice more fatigue than normal with associated dizziness(felt like room was spinning) 3x since March which each episode lasting about 10 seconds and then self resolving. Patient otherwise denied any other complaints and is complaint with his anticoagulation and other medications. Reviewed medication list with patient and updated on patient's chart. Explained about the procedure in detail and all risks/benefits of the procedure explained and the patient understood and signed all the consents. Reviewed pre-surgical testing H&P and All scripts note from 09/19/22.     EKG: Atrial fibrillation with RVR at a rate of 110 with QTc of 514    COVID PCR not detected on 10/16/22    ADDENDUM: Labs reviewed with Dr. Box and patient's platelet count was 58 on CBC and repeat blue top was 48. These results were from 10/15/22. As per Dr. Box no need to redraw CBC and Dr. Box is okay to proceed with the procedure with this current platelet count.

## 2022-10-18 NOTE — CHART NOTE - NSCHARTNOTEFT_GEN_A_CORE
pt. s/p afib ablation    Right groin was covered with a  clear with a gauge and clear Tegaderm. It was clean, dry, and intact. No bleeding, drainage hematoma, ecchymosis, or bruit appreciated.        Plan:  s/p Afib Ablation:  Post-op  ablation instruction has been verbally explained and given to the patient. Patient expressed understanding and all questions were answered   Patient is schedule for an appointment on 12/19 at 9:45am with Dr. Box in the EP Clinic ( 4th floor Oncology [facility] [address], [address], [place], [place], [address] [phone] )    Remove the bandage after 24-48 hours  No scrubbing the incision site for 7 days   No lifting more than 5lb or exertional exercising such as jogging, running, bike riding for 7 days  No swimming pool, Jacuzzi, or bath for 5 days.   Patient can shower 24 hours after procedure . Pat the area dry  Pt was instructed to call [phone] if the following occurs:      - fever with temperature > 100.6      - swelling, drainage or bleeding at the site incision       - chest pain, SOB, n/v

## 2022-10-18 NOTE — CHART NOTE - NSCHARTNOTEFT_GEN_A_CORE
Spoke with Dr. Box and he recommended that patient can restart his Eliquis medication tonight(10/18) when he gets home.

## 2022-10-19 ENCOUNTER — NON-APPOINTMENT (OUTPATIENT)
Age: 57
End: 2022-10-19

## 2022-10-31 ENCOUNTER — NON-APPOINTMENT (OUTPATIENT)
Age: 57
End: 2022-10-31

## 2022-11-08 ENCOUNTER — RX RENEWAL (OUTPATIENT)
Age: 57
End: 2022-11-08

## 2022-11-28 ENCOUNTER — APPOINTMENT (OUTPATIENT)
Dept: ELECTROPHYSIOLOGY | Facility: CLINIC | Age: 57
End: 2022-11-28

## 2022-12-01 ENCOUNTER — APPOINTMENT (OUTPATIENT)
Dept: INTERNAL MEDICINE | Facility: CLINIC | Age: 57
End: 2022-12-01

## 2022-12-01 VITALS
TEMPERATURE: 98 F | HEIGHT: 72 IN | BODY MASS INDEX: 29.2 KG/M2 | HEART RATE: 68 BPM | OXYGEN SATURATION: 98 % | WEIGHT: 215.56 LBS

## 2022-12-01 VITALS — SYSTOLIC BLOOD PRESSURE: 122 MMHG | DIASTOLIC BLOOD PRESSURE: 76 MMHG

## 2022-12-01 DIAGNOSIS — J06.9 ACUTE UPPER RESPIRATORY INFECTION, UNSPECIFIED: ICD-10-CM

## 2022-12-01 PROBLEM — E11.9 TYPE 2 DIABETES MELLITUS WITHOUT COMPLICATIONS: Chronic | Status: ACTIVE | Noted: 2022-10-13

## 2022-12-01 PROBLEM — I48.91 UNSPECIFIED ATRIAL FIBRILLATION: Chronic | Status: ACTIVE | Noted: 2022-10-13

## 2022-12-01 PROBLEM — I10 ESSENTIAL (PRIMARY) HYPERTENSION: Chronic | Status: ACTIVE | Noted: 2022-10-13

## 2022-12-01 PROCEDURE — 99214 OFFICE O/P EST MOD 30 MIN: CPT

## 2022-12-01 RX ORDER — PENCICLOVIR 10 MG/G
1 CREAM TOPICAL
Qty: 1 | Refills: 0 | Status: ACTIVE | COMMUNITY
Start: 2020-12-19 | End: 1900-01-01

## 2022-12-01 NOTE — PHYSICAL EXAM
[Normal TMs] : both tympanic membranes were normal [No Lymphadenopathy] : no lymphadenopathy [Normal] : no respiratory distress, lungs were clear to auscultation bilaterally and no accessory muscle use [de-identified] : coughing

## 2022-12-01 NOTE — HISTORY OF PRESENT ILLNESS
[FreeTextEntry1] : sinus symps [de-identified] : some congestion, cough for several days--no fever\par on losartin, eliquis, metformin, ppi , lasix\par reviewed recent GI and cardio notes\par sugar 120's--to see endo

## 2022-12-05 ENCOUNTER — RX RENEWAL (OUTPATIENT)
Age: 57
End: 2022-12-05

## 2022-12-15 ENCOUNTER — APPOINTMENT (OUTPATIENT)
Dept: CARDIOLOGY | Facility: CLINIC | Age: 57
End: 2022-12-15

## 2022-12-28 ENCOUNTER — APPOINTMENT (OUTPATIENT)
Dept: INTERNAL MEDICINE | Facility: CLINIC | Age: 57
End: 2022-12-28

## 2022-12-29 ENCOUNTER — APPOINTMENT (OUTPATIENT)
Dept: CARDIOLOGY | Facility: CLINIC | Age: 57
End: 2022-12-29

## 2022-12-29 VITALS
WEIGHT: 211.25 LBS | DIASTOLIC BLOOD PRESSURE: 88 MMHG | SYSTOLIC BLOOD PRESSURE: 146 MMHG | OXYGEN SATURATION: 97 % | BODY MASS INDEX: 28.61 KG/M2 | TEMPERATURE: 98.4 F | HEIGHT: 72 IN | HEART RATE: 61 BPM

## 2022-12-29 PROCEDURE — 99215 OFFICE O/P EST HI 40 MIN: CPT

## 2022-12-29 NOTE — REVIEW OF SYSTEMS
[Fever] : no fever [Weight Gain (___ Lbs)] : no recent weight gain [Chills] : no chills [Feeling Fatigued] : feeling fatigued [Weight Loss (___ Lbs)] : no recent weight loss [Blurry Vision] : no blurred vision [Sore Throat] : no sore throat [SOB] : no shortness of breath [Dyspnea on exertion] : not dyspnea during exertion [Chest Discomfort] : no chest discomfort [Lower Ext Edema] : no extremity edema [Palpitations] : no palpitations [Orthopnea] : no orthopnea [Syncope] : no syncope [Cough] : no cough [Wheezing] : no wheezing [Nausea] : no nausea [Vomiting] : no vomiting [Dizziness] : no dizziness [Confusion] : no confusion was observed [Easy Bleeding] : no tendency for easy bleeding [Easy Bruising] : no tendency for easy bruising

## 2022-12-29 NOTE — HISTORY OF PRESENT ILLNESS
[FreeTextEntry1] : 57M w/ DM, HTN afib, presents for f/u\par PMD: Dr Blake douglas\par EPS: Dr Martine Box\par \par Previously,\par pt seen in cardiology for an initial eval 11/2020, with c/o palpitations, and low energy. TTE done at that time showed a LV EF 40%. EKG without arrhythmia, and calcium score was 14. \par 3/22, feeling well. ekg showing afib ?new. pt not interested in a/c. \par 8/22, pt in afib, asymptomatic. sent to EP, plan for ablation. \par \par \par pt now presents for follow up. \par today,\par \par \par pt overall feeling well, had a recent uri, s/p z-pack.\par \par pt denies cp sob.  \par nos s/p afib ablation with Dr Box feeling well. no palpitations, dizziness. \par \par \par \par pt states he can walk several miles without difficulty. 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 pt state he feels run down. low energy level over the past few months. \par \par \par \par Prior cardiac workup: none\par Recent labs: 11/2020: tot chol 157 tg 192 hdl 46 ldl 72 Cr 0.83 GFR 98 a1c 6.7\par \par Med hx: DM2 HTN\par Sx hx: appendectomy\par Fam hx: no known cardiac hx. \par Social hx: lives in [place] with wife and 2 kids. 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 \par Meds: losartan 50 toprol 50 asa 81 amio 200 lasix 40 eliquis 5 metformin, viagra prn\par Allergies: nkda\par

## 2022-12-29 NOTE — PHYSICAL EXAM
[Well Developed] : well developed [Well Nourished] : well nourished [No Acute Distress] : no acute distress [Normal Venous Pressure] : normal venous pressure [Normal S1, S2] : normal S1, S2 [No Murmur] : no murmur [Clear Lung Fields] : clear lung fields [Good Air Entry] : good air entry [No Respiratory Distress] : no respiratory distress  [Soft] : abdomen soft [Non Tender] : non-tender [Normal Gait] : normal gait [No Edema] : no edema [No Focal Deficits] : no focal deficits [Alert and Oriented] : alert and oriented

## 2022-12-29 NOTE — DISCUSSION/SUMMARY
[FreeTextEntry1] : 56M w/ DM, HTN afib, presents for f/u\par \par 1. HTN\par -controlled at home\par -cont losartan, toprol, lasix\par \par 2. Afib s/p ablation\par -asymptomatic, euvolemic\par -HR regular today\par -chadsvasc score 1-2, pt on eliquis now\par -cont toprol, also on amio now 200 qd\par -pt to follow up with EPS to discuss duration of amio tx. \par \par \par f/u 4 months\par \par time spent on complete encounter >40 min\par

## 2023-01-12 ENCOUNTER — NON-APPOINTMENT (OUTPATIENT)
Age: 58
End: 2023-01-12

## 2023-01-12 ENCOUNTER — APPOINTMENT (OUTPATIENT)
Dept: ELECTROPHYSIOLOGY | Facility: CLINIC | Age: 58
End: 2023-01-12
Payer: COMMERCIAL

## 2023-01-12 VITALS
DIASTOLIC BLOOD PRESSURE: 79 MMHG | HEIGHT: 72 IN | WEIGHT: 205 LBS | BODY MASS INDEX: 27.77 KG/M2 | OXYGEN SATURATION: 97 % | HEART RATE: 71 BPM | SYSTOLIC BLOOD PRESSURE: 138 MMHG

## 2023-01-12 DIAGNOSIS — Z79.899 OTHER LONG TERM (CURRENT) DRUG THERAPY: ICD-10-CM

## 2023-01-12 PROCEDURE — 99215 OFFICE O/P EST HI 40 MIN: CPT | Mod: 25

## 2023-01-12 PROCEDURE — 93000 ELECTROCARDIOGRAM COMPLETE: CPT

## 2023-01-12 RX ORDER — AMIODARONE HYDROCHLORIDE 200 MG/1
200 TABLET ORAL DAILY
Qty: 30 | Refills: 2 | Status: DISCONTINUED | COMMUNITY
Start: 2022-09-19 | End: 2023-01-12

## 2023-01-13 NOTE — CARDIOLOGY SUMMARY
[de-identified] : 6/13/2022: mild mitral insufficiency, mild tricuspid insufficiency, moderate left atrial dilation, mild LV dilation and septal hypokinesis with LVEF 40%, possible left pleural effusion \par \par 11/2020: Summary:\par 1.Left ventricular ejection fraction, by visual estimation, is 55 to 60%.\par 2.Normal global left ventricular systolic function.\par 3.Increased relative wall thickness with normal mass index consistent with left ventricular \par concentric remodeling.\par 4.Normal right ventricular size and function.\par 5.Mildly enlarged left atrium.\par 6.Mild mitral valve regurgitation.\par 7.Thickening of the anterior mitral valve leaflet.\par 8.Sclerotic aortic valve with normal opening.\par 9.There is mild  aortic root calcification.

## 2023-01-13 NOTE — HISTORY OF PRESENT ILLNESS
[FreeTextEntry1] : Dennis Hendricks is a 56y/o man with Hx of HTN, DMII, and persistent afib now s/p PVI and CTI ablation on 10/18/2022, on Amiodarone, who presents today for routine f/u post ablation. Admits doing well post procedure. Denies chest pain, palpitations, SOB, syncope or near syncope. Right groin without pain, swelling, or bruising. Remains on Amiodarone 200mg daily and Eliquis for thromboembolic prophylaxis. Denies s/s of bleeding.

## 2023-01-13 NOTE — DISCUSSION/SUMMARY
[EKG obtained to assist in diagnosis and management of assessed problem(s)] : EKG obtained to assist in diagnosis and management of assessed problem(s) [FreeTextEntry1] : Impression:\par \par 1. Persistent afib: s/p PVI and CTI ablation on 10/18/2022. EKG performed today to assess for presence of recurrent afib and reveals NSR. Last ECHO with LVEF 40%, LA 50mm. Now 3 mo psot procedure, discontinue Amiodarone at this time. Resume Eliquis. Concern about long term use of anticoagulation. Recommend undergoing placement of ILR for long term monitoring of afib and to allow for safe discontinuation of AC in future. Risks, benefits, and alternatives discussed.\par \par 2. HTN: resume oral antihypertensives as prescribed. Encouraged heart healthy diet, sodium restriction, and weight loss. Continue regular f/u with Cardiologist for further HTN management.\par \par 3. NICM: most recent ECHO in setting of afib with LVEF now 40%. Prior ECHO in 2020 with normal LVEF. Suspect tachy/arrhythmia induced. Resume OMT as prescribed, encouraged heart healthy diet, daily weight, and regular f/u with Cardiology/Heart failure team as scheduled. Plan for repeat ECHO now NSR is established. \par \par Continue f/u with Cardiologist and RTO for f/u in 3 months.

## 2023-01-17 ENCOUNTER — APPOINTMENT (OUTPATIENT)
Dept: INTERNAL MEDICINE | Facility: CLINIC | Age: 58
End: 2023-01-17

## 2023-01-26 ENCOUNTER — APPOINTMENT (OUTPATIENT)
Dept: HEPATOLOGY | Facility: CLINIC | Age: 58
End: 2023-01-26
Payer: COMMERCIAL

## 2023-01-26 VITALS
TEMPERATURE: 96.9 F | SYSTOLIC BLOOD PRESSURE: 136 MMHG | WEIGHT: 210 LBS | HEART RATE: 69 BPM | DIASTOLIC BLOOD PRESSURE: 83 MMHG | RESPIRATION RATE: 16 BRPM | HEIGHT: 72 IN | OXYGEN SATURATION: 95 % | BODY MASS INDEX: 28.44 KG/M2

## 2023-01-26 DIAGNOSIS — R18.8 OTHER ASCITES: ICD-10-CM

## 2023-01-26 DIAGNOSIS — K86.9 DISEASE OF PANCREAS, UNSPECIFIED: ICD-10-CM

## 2023-01-26 PROCEDURE — 99214 OFFICE O/P EST MOD 30 MIN: CPT

## 2023-01-26 RX ORDER — BENZONATATE 100 MG/1
100 CAPSULE ORAL 3 TIMES DAILY
Qty: 21 | Refills: 0 | Status: DISCONTINUED | COMMUNITY
Start: 2022-12-01 | End: 2023-01-26

## 2023-01-26 RX ORDER — AZITHROMYCIN 250 MG/1
250 TABLET, FILM COATED ORAL
Qty: 1 | Refills: 0 | Status: DISCONTINUED | COMMUNITY
Start: 2022-12-01 | End: 2023-01-26

## 2023-01-26 NOTE — PHYSICAL EXAM
[General Appearance - Alert] : alert [General Appearance - In No Acute Distress] : in no acute distress [Sclera] : the sclera and conjunctiva were normal [] : no respiratory distress [Respiration, Rhythm And Depth] : normal respiratory rhythm and effort [Auscultation Breath Sounds / Voice Sounds] : lungs were clear to auscultation bilaterally [Heart Sounds] : normal S1 and S2 [Edema] : there was no peripheral edema [Bowel Sounds] : normal bowel sounds [Abdomen Soft] : soft [Abdomen Tenderness] : non-tender [Abdomen Hernia] : no hernia was discovered [Oriented To Time, Place, And Person] : oriented to person, place, and time [Affect] : the affect was normal [Scleral Icterus] : No Scleral Icterus [Ascites Fluid Wave] : no ascites fluid wave [Ascites Tense] : ascites is not tense [Asterixis] : no asterixis observed [Jaundice] : No jaundice [Palmar Erythema] : no Palmar Erythema

## 2023-01-26 NOTE — ASSESSMENT
[FreeTextEntry1] : Dennis Hendricks 57 yoM with pmhx HTN, DM2 and aFib here for follow up for  decompensated cirrhosis. \par \par # Decompensated Cirrhosis\par - Explained that he has developed decompensated cirrhosis as evidence by imaging showing cirrhosis with portal HTN and ascites likely secondary to combination of NAFLD and alcohol use. He has risks for NAFLD. \par -Fibroscan test 7/25/22 showed F1, S3, explained that test most likely under estimated liver stiffness. Will get MRE. \par -I discussed the meaning of cirrhosis with the patient. I reviewed the natural history of the disease. I explained the risks for the development of esophageal varices with and without bleeding, hepatic encephalopathy, ascites, hepatocellular carcinoma, and liver failure. I have explained that disease can progress to the point of requiring an evaluation for liver transplantation. I have explained the need for imaging every 6 months to screen for liver cancer.\par \par #Cirrhosis Surveillance\par  > Esophageal / gastric varices - EGD on 10/12/22 showed non-bleeding large (> 5 mm) esophageal varices, type 2 gastroesophageal varices (GOV2, esophageal varices which extend along the fundus), without bleeding. Will check to with cardiologist regarding BB. \par \par  > Ascites - MRI 6/20/22 showed moderate ascites. Never had paracentesis. LLE resolved. Ascites seem to have improved with furosemide 40 mg daily. Recommended that he continue. \par  \par  > Hepatic encephalopathy - none on exam today \par \par  > HCC screening - none on MRI 6/20/22. Due now for screening\par \par  > Portal vein thrombosis - none\par \par  > Transplant candidate -last MELD Na 11 (7/14/22), too low for evaluation. \par \par #Alcohol use disorder\par -We discussed consequences of heavy alcohol use long term including the development of fibrosis and cirrhosis as well as alcohol related fatty liver. Continues to drink despite knowing that he has cirrhosis but has cut down on consumption. Recommended  enrollment in SA program however not yet. \par \par # Both abdo recent CT and MRI showed a  3.1 X 1.87 CM pancreatic calcified lesion not associated with an enhancing masslike soft tissue. Recommended repeating MRI  for further evaluation. \par \par #non-bleeding erosive gastropathy on EGD, recommended omeprazole 40 mg daily X 1 month\par \par #Health maintenance in cirrhosis\par -Hep A immune. Recommended vaccination for hep B since non immune which he started in July 2022 but doesn't appear to have completed series. Will check antibody level. \par -Patient was counseled to: abstain from alcohol and all illicit drugs; avoid use of herbal and dietary supplements due to potential hepatotoxicity; limit use of acetaminophen to <2 grams per day; avoid use of nonsteroidal antiinflammatory drugs (NSAIDs) as these can lead to diuretic resistance and precipitate renal dysfunction in patients with advanced liver disease; avoid eating any unpasteurized dairy products; avoid eating any raw or undercooked eggs, fish, poultry, or meat; and avoid eating raw/steamed oysters or other shellfish to avoid risk of Vibrio infection.\par \par Plan:\par BW, MRE/MRI and pt will call to review. \par

## 2023-01-26 NOTE — HISTORY OF PRESENT ILLNESS
[de-identified] : Dennis Hendricks 57 yoM with pmhx HTN, DM2 and aFib here for follow up for  decompensated cirrhosis. \par \par 1/26/23 No follow up since his initial visit in July 2022. Reports mid abdominal discomfort but states not pain. Denies nausea, vomiting, melena, hematochezia, or hematemesis. Had EGD on 10/12/22 with Dr. Santiago showed non-bleeding erosive gastropathy and was prescribed Omeprazole 40 mg daily but pt doesn't recall ever starting. Reports abdomen no longer swollen and no LLE. Still drinking about 1 glass of wine intermittently through the week and is trying to stop completely. No recent BW or imaging. Fibroscan test 7/25/22 showed F1, S3\par \par 7/14/22 Initial visit: Here for evaluation of cirrhosis referred by his PCP Dr. Blake aTo. Reports that about 15 yrs ago there was "something with his LFTs" and was seen by a physician who performed abdo US in the office and advised him to cut back on alcohol intakes.  2 months ago he developed increased in abdominal girth and swollen legs for the first time and was started on furosemide with improvement. Never had paracentesis. He had abdo CT w/o contrast on 6/13/22 showing moderate ascites, steatosis, unable to evaluate for liver lesion, 3.1 X 1.6 CM calcified lesion at the pancreatic tail. This was follow up with MRI on 6/20/22 no hepatic lesions, probable cirrhotic morphology, hepatic steatosis, moderate ascites, signs of portal HTN including splenomegaly and varices. There is a  3.1 X 1.87 CM pancreatic calcified lesion not associated with an enhancing masslike soft tissue. His BW showed mostly normal liver enzymes with occasional mild elevated AST with a rise in TB of 1.3 and 1.6 in 2022. Albumin levels normal. INR was 1.17 on 6/9/22 not on anticoag. \par \par Feels well. Mental status is clear. Denies abdominal pain, nausea, vomiting, melena, hematochezia, or hematemesis. LLE resolved and abdomen no longer distended after taking furosemide. \par \par Started drinking alcohol in his early 20's and drank more in the past. Now drinking few beers a day.  \par \par -Reports had colonoscopy about 2020 with polyps and was advised to repeat in 3 yrs. \par -EGD 10/12/22 -Non-bleeding large (> 5 mm) esophageal varices.\par                          - Type 2 gastroesophageal varices (GOV2, esophageal varices which extend along the                             fundus), without bleeding.\par                          - Portal hypertensive gastropathy.\par                          - Non-bleeding erosive gastropathy. Biopsied.\par                          - Normal examined duodenum.\par

## 2023-01-26 NOTE — REVIEW OF SYSTEMS
[As Noted in HPI] : as noted in HPI [Negative] : Heme/Lymph [Fever] : no fever [Chills] : no chills [Chest Pain] : no chest pain [Lower Ext Edema] : no extremity edema [Itching] : no itching [Confused] : no confusion

## 2023-01-27 LAB
AFP-TM SERPL-MCNC: 5.1 NG/ML
ALBUMIN SERPL ELPH-MCNC: 4.2 G/DL
ALP BLD-CCNC: 132 U/L
ALT SERPL-CCNC: 38 U/L
ANION GAP SERPL CALC-SCNC: 13 MMOL/L
AST SERPL-CCNC: 38 U/L
BILIRUB SERPL-MCNC: 0.6 MG/DL
BUN SERPL-MCNC: 9 MG/DL
CALCIUM SERPL-MCNC: 9.7 MG/DL
CHLORIDE SERPL-SCNC: 105 MMOL/L
CO2 SERPL-SCNC: 24 MMOL/L
CREAT SERPL-MCNC: 0.63 MG/DL
EGFR: 110 ML/MIN/1.73M2
INR PPP: 1.12 RATIO
POTASSIUM SERPL-SCNC: 4.4 MMOL/L
PROT SERPL-MCNC: 7.4 G/DL
PT BLD: 13 SEC
SODIUM SERPL-SCNC: 142 MMOL/L

## 2023-01-30 LAB
BASOPHILS # BLD AUTO: 0.02 K/UL
BASOPHILS NFR BLD AUTO: 0.4 %
EOSINOPHIL # BLD AUTO: 0.07 K/UL
EOSINOPHIL NFR BLD AUTO: 1.5 %
HCT VFR BLD CALC: 44.1 %
HGB BLD-MCNC: 15.2 G/DL
IMM GRANULOCYTES NFR BLD AUTO: 0.4 %
LYMPHOCYTES # BLD AUTO: 1.68 K/UL
LYMPHOCYTES NFR BLD AUTO: 35 %
MAN DIFF?: NORMAL
MCHC RBC-ENTMCNC: 33.1 PG
MCHC RBC-ENTMCNC: 34.5 GM/DL
MCV RBC AUTO: 96.1 FL
MONOCYTES # BLD AUTO: 0.59 K/UL
MONOCYTES NFR BLD AUTO: 12.3 %
NEUTROPHILS # BLD AUTO: 2.42 K/UL
NEUTROPHILS NFR BLD AUTO: 50.4 %
PLATELET # BLD AUTO: 70 K/UL
RBC # BLD: 4.59 M/UL
RBC # FLD: 13.7 %
WBC # FLD AUTO: 4.8 K/UL

## 2023-03-22 ENCOUNTER — NON-APPOINTMENT (OUTPATIENT)
Age: 58
End: 2023-03-22

## 2023-03-22 ENCOUNTER — APPOINTMENT (OUTPATIENT)
Dept: CARDIOLOGY | Facility: CLINIC | Age: 58
End: 2023-03-22
Payer: COMMERCIAL

## 2023-03-22 VITALS
BODY MASS INDEX: 28.71 KG/M2 | OXYGEN SATURATION: 97 % | DIASTOLIC BLOOD PRESSURE: 95 MMHG | WEIGHT: 212 LBS | HEART RATE: 59 BPM | HEIGHT: 72 IN | TEMPERATURE: 98.6 F | SYSTOLIC BLOOD PRESSURE: 167 MMHG

## 2023-03-22 PROCEDURE — 93000 ELECTROCARDIOGRAM COMPLETE: CPT

## 2023-03-22 PROCEDURE — 99215 OFFICE O/P EST HI 40 MIN: CPT | Mod: 25

## 2023-03-22 NOTE — DISCUSSION/SUMMARY
[FreeTextEntry1] : 57M w/ DM, HTN afib s/p ablation, presents for f/u\par \par 1. HTN\par -controlled at home, elevated today here\par -cont losartan, toprol\par \par 2. Afib s/p ablation\par -asymptomatic, euvolemic\par -HR regular today, EKG showing SR\par -chadsvasc score 1-2, pt on eliquis now\par -cont toprol\par -will repeat tte today wot eval EF now that pt in SR\par \par \par f/u 4 months\par time spent on complete encounter >40 min\par  [EKG obtained to assist in diagnosis and management of assessed problem(s)] : EKG obtained to assist in diagnosis and management of assessed problem(s)

## 2023-03-22 NOTE — HISTORY OF PRESENT ILLNESS
[FreeTextEntry1] : 57M w/ DM, HTN afib, presents for f/u\par PMD: Dr Blake douglas\par EPS: Dr Martine Box\par \par Previously,\par pt seen in cardiology for an initial eval 11/2020, with c/o palpitations, and low energy. TTE done at that time showed a LV EF 40%. EKG without arrhythmia, and calcium score was 14. \par 3/22, feeling well. ekg showing afib ?new. pt not interested in a/c. \par 8/22, pt in afib, asymptomatic. sent to EP, plan for ablation. \par last seen 1/23 feeling well. s/p afib ablation. on toprol, on amio, on eliquis. pt saw EP 1/23, amio d/c, will cont on a/c for now. \par \par pt now presents for follow up. \par today,\par \par pt overall feeling well, pt denies cp sob. denies palpitations, dizziness syncope. diaphoresis. \par pt states he can walk several miles without difficulty. \par \par \par Prior cardiac workup: none\par Recent labs: 11/2020: tot chol 157 tg 192 hdl 46 ldl 72 Cr 0.83 GFR 98 a1c 6.7\par \par Med hx: DM2 HTN\par Sx hx: appendectomy\par Fam hx: no known cardiac hx. \par Social hx: lives in [place] with wife and 2 kids. 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 \par Meds: losartan 50 toprol 50 eliquis 5 metformin, viagra prn\par Allergies: nkda

## 2023-04-04 ENCOUNTER — RX RENEWAL (OUTPATIENT)
Age: 58
End: 2023-04-04

## 2023-04-10 ENCOUNTER — APPOINTMENT (OUTPATIENT)
Dept: CARDIOLOGY | Facility: CLINIC | Age: 58
End: 2023-04-10

## 2023-05-08 ENCOUNTER — NON-APPOINTMENT (OUTPATIENT)
Age: 58
End: 2023-05-08

## 2023-05-08 ENCOUNTER — APPOINTMENT (OUTPATIENT)
Dept: ELECTROPHYSIOLOGY | Facility: CLINIC | Age: 58
End: 2023-05-08
Payer: COMMERCIAL

## 2023-05-08 VITALS
BODY MASS INDEX: 28.71 KG/M2 | WEIGHT: 212 LBS | SYSTOLIC BLOOD PRESSURE: 149 MMHG | TEMPERATURE: 98.6 F | DIASTOLIC BLOOD PRESSURE: 90 MMHG | HEIGHT: 72 IN | HEART RATE: 67 BPM | OXYGEN SATURATION: 96 %

## 2023-05-08 DIAGNOSIS — I42.8 OTHER CARDIOMYOPATHIES: ICD-10-CM

## 2023-05-08 PROCEDURE — 93000 ELECTROCARDIOGRAM COMPLETE: CPT

## 2023-05-08 PROCEDURE — 99214 OFFICE O/P EST MOD 30 MIN: CPT | Mod: 25

## 2023-05-09 NOTE — CARDIOLOGY SUMMARY
[de-identified] : 6/13/2022: mild mitral insufficiency, mild tricuspid insufficiency, moderate left atrial dilation, mild LV dilation and septal hypokinesis with LVEF 40%, possible left pleural effusion \par \par 11/2020: Summary:\par 1.Left ventricular ejection fraction, by visual estimation, is 55 to 60%.\par 2.Normal global left ventricular systolic function.\par 3.Increased relative wall thickness with normal mass index consistent with left ventricular \par concentric remodeling.\par 4.Normal right ventricular size and function.\par 5.Mildly enlarged left atrium.\par 6.Mild mitral valve regurgitation.\par 7.Thickening of the anterior mitral valve leaflet.\par 8.Sclerotic aortic valve with normal opening.\par 9.There is mild  aortic root calcification.

## 2023-05-09 NOTE — DISCUSSION/SUMMARY
[EKG obtained to assist in diagnosis and management of assessed problem(s)] : EKG obtained to assist in diagnosis and management of assessed problem(s) [FreeTextEntry1] : Impression:\par \par 1. Persistent afib: s/p PVI and CTI ablation on 10/18/2022. EKG performed today to assess for presence of recurrent afib and reveals NSR. Last ECHO with LVEF 40%, LA 50mm. Resume Eliquis for thromboembolic prophylaxis. Recommend undergoing Holter monitoring to ensure no evidence of recurrent afib or tachy/bradyarrhythmias associated with symptoms. \par \par 2. HTN: BP elevated in office, recommend increasing losartan to 100mg daily (last creatinine WNL). Will discuss with Dr. Potter; resume oral antihypertensives as prescribed. Encouraged heart healthy diet, sodium restriction, and weight loss. Continue regular f/u with Cardiologist for further HTN management.\par \par 3. NICM: most recent ECHO in setting of afib with LVEF now 40%. Prior ECHO in 2020 with normal LVEF. Suspect tachy/arrhythmia induced. Resume OMT as prescribed, encouraged heart healthy diet, daily weight, and regular f/u with Cardiology/Heart failure team as scheduled. Plan for repeat ECHO now NSR is established. \par \par Plan for 28 day Zio Patch. Resume regular f/u with Cardiologist and RTO in 3 months.

## 2023-05-09 NOTE — HISTORY OF PRESENT ILLNESS
[FreeTextEntry1] : Dennis Hendricks is a 56y/o man with Hx of HTN, DMII, and persistent afib now s/p PVI and CTI ablation on 10/18/2022, on Amiodarone, who presents today for routine f/u. Admits doing well post procedure. Denies chest pain, palpitations, SOB, syncope or near syncope. Remains on Eliquis for thromboembolic prophylaxis. Denies s/s of bleeding. Was recently told he has cataracts, no pending surgery at this time. Does struggle with focusing and feeling anxious when driving which is new for him. Unsure of afib recurrence. Seeing Dr. Potter soon for ECHO.

## 2023-05-10 RX ORDER — BLOOD PRESSURE TEST KIT-LARGE
KIT MISCELLANEOUS
Qty: 1 | Refills: 0 | Status: ACTIVE | COMMUNITY
Start: 2023-05-10 | End: 1900-01-01

## 2023-05-12 ENCOUNTER — APPOINTMENT (OUTPATIENT)
Dept: MRI IMAGING | Facility: CLINIC | Age: 58
End: 2023-05-12

## 2023-05-17 ENCOUNTER — RX RENEWAL (OUTPATIENT)
Age: 58
End: 2023-05-17

## 2023-06-08 ENCOUNTER — APPOINTMENT (OUTPATIENT)
Dept: CARDIOLOGY | Facility: CLINIC | Age: 58
End: 2023-06-08
Payer: COMMERCIAL

## 2023-06-08 VITALS
OXYGEN SATURATION: 96 % | HEIGHT: 72 IN | WEIGHT: 218 LBS | SYSTOLIC BLOOD PRESSURE: 148 MMHG | HEART RATE: 66 BPM | DIASTOLIC BLOOD PRESSURE: 89 MMHG | TEMPERATURE: 97.9 F | BODY MASS INDEX: 29.53 KG/M2

## 2023-06-08 PROCEDURE — 99215 OFFICE O/P EST HI 40 MIN: CPT

## 2023-06-08 NOTE — REVIEW OF SYSTEMS
[Fever] : no fever [Headache] : no headache [Weight Gain (___ Lbs)] : no recent weight gain [Chills] : no chills [Feeling Fatigued] : not feeling fatigued [Weight Loss (___ Lbs)] : no recent weight loss [Blurry Vision] : no blurred vision [Sore Throat] : no sore throat [SOB] : no shortness of breath [Dyspnea on exertion] : not dyspnea during exertion [Chest Discomfort] : no chest discomfort [Lower Ext Edema] : no extremity edema [Palpitations] : no palpitations [Orthopnea] : no orthopnea [Syncope] : no syncope [Cough] : no cough [Wheezing] : no wheezing [Abdominal Pain] : no abdominal pain [Nausea] : no nausea [Vomiting] : no vomiting [Constipation] : no constipation [Confusion] : no confusion was observed [Easy Bleeding] : no tendency for easy bleeding [Easy Bruising] : no tendency for easy bruising [de-identified] : see hpi

## 2023-06-08 NOTE — DISCUSSION/SUMMARY
[FreeTextEntry1] : 57M w/ DM, HTN afib s/p ablation, presents for f/u\par \par 1. HTN\par -elevated today here\par -cont losartan, toprol\par -will increase losartan from 50 to 100\par \par 2. Afib s/p ablation\par -asymptomatic, euvolemic\par -HR regular today\par -on eliquis\par -cont toprol\par -will repeat tte today to eval EF now that pt in SR\par \par \par f/u 6 months\par time spent on complete encounter >40 min\par

## 2023-06-08 NOTE — HISTORY OF PRESENT ILLNESS
[FreeTextEntry1] : 57M w/ DM, HTN afib, presents for f/u\par PMD: Dr Blake douglas\par EPS: Dr Martine Box\par \par Previously,\par pt seen in cardiology for an initial eval 11/2020, with c/o palpitations, and low energy. TTE done at that time showed a LV EF 40%. EKG without arrhythmia, and calcium score was 14. \par 3/22, feeling well. ekg showing afib ?new. pt not interested in a/c. \par 8/22, pt in afib, asymptomatic. sent to EP, plan for ablation. \par 1/23 feeling well. s/p afib ablation. on toprol, on amio, on eliquis. pt saw EP 1/23, amio d/c, will cont on a/c for now. \par last seen 3/23, feeling well. bp controlled on home log\par \par pt now presents for follow up. \par today,\par \par pt feeling well, no cp or sob. states he does feel dizzy occasionally. bp has been high lately as well. \par \par no syncope. \par pt states he recently was told he has b/l cataracts. \par \par denies palpitations. pt states he can walk several miles without difficulty. \par \par \par Prior cardiac workup: none\par Recent labs: 11/2020: tot chol 157 tg 192 hdl 46 ldl 72 Cr 0.83 GFR 98 a1c 6.7\par \par Med hx: DM2 HTN\par Sx hx: appendectomy\par Fam hx: no known cardiac hx. \par Social hx: lives in [place] with wife and 2 kids. 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 \par Meds: losartan 50 toprol 50 eliquis 5 metformin, viagra prn\par Allergies: nkda \par

## 2023-06-12 ENCOUNTER — APPOINTMENT (OUTPATIENT)
Dept: INTERNAL MEDICINE | Facility: CLINIC | Age: 58
End: 2023-06-12

## 2023-06-16 ENCOUNTER — RX RENEWAL (OUTPATIENT)
Age: 58
End: 2023-06-16

## 2023-07-12 ENCOUNTER — NON-APPOINTMENT (OUTPATIENT)
Age: 58
End: 2023-07-12

## 2023-07-18 ENCOUNTER — RX RENEWAL (OUTPATIENT)
Age: 58
End: 2023-07-18

## 2023-07-19 ENCOUNTER — APPOINTMENT (OUTPATIENT)
Dept: INTERNAL MEDICINE | Facility: CLINIC | Age: 58
End: 2023-07-19

## 2023-07-20 ENCOUNTER — APPOINTMENT (OUTPATIENT)
Dept: HEPATOLOGY | Facility: CLINIC | Age: 58
End: 2023-07-20

## 2023-07-25 ENCOUNTER — APPOINTMENT (OUTPATIENT)
Dept: INTERNAL MEDICINE | Facility: CLINIC | Age: 58
End: 2023-07-25
Payer: COMMERCIAL

## 2023-07-25 VITALS
OXYGEN SATURATION: 95 % | HEART RATE: 71 BPM | HEIGHT: 72 IN | WEIGHT: 217 LBS | BODY MASS INDEX: 29.39 KG/M2 | TEMPERATURE: 98.4 F

## 2023-07-25 VITALS — DIASTOLIC BLOOD PRESSURE: 76 MMHG | SYSTOLIC BLOOD PRESSURE: 128 MMHG

## 2023-07-25 PROCEDURE — 99214 OFFICE O/P EST MOD 30 MIN: CPT

## 2023-07-25 NOTE — HISTORY OF PRESENT ILLNESS
[FreeTextEntry1] : f/u diabetes, cardio, htn [de-identified] : glu running 130-140\par  all meds reviewed--apparently taking metoprolol and naldolol\par spec notes reviewed\par plans to do holter with cardio

## 2023-07-25 NOTE — PHYSICAL EXAM
[Normal Rate] : normal rate  [Regular Rhythm] : with a regular rhythm [Normal] : no jugular venous distention, supple, no lymphadenopathy and the thyroid was normal and there were no nodules present

## 2023-07-26 LAB
ALBUMIN SERPL ELPH-MCNC: 3.9 G/DL
ALP BLD-CCNC: 126 U/L
ALT SERPL-CCNC: 37 U/L
ANION GAP SERPL CALC-SCNC: 12 MMOL/L
AST SERPL-CCNC: 44 U/L
BILIRUB SERPL-MCNC: 0.7 MG/DL
BUN SERPL-MCNC: 8 MG/DL
CALCIUM SERPL-MCNC: 9 MG/DL
CHLORIDE SERPL-SCNC: 105 MMOL/L
CO2 SERPL-SCNC: 25 MMOL/L
CREAT SERPL-MCNC: 0.57 MG/DL
EGFR: 114 ML/MIN/1.73M2
ESTIMATED AVERAGE GLUCOSE: 151 MG/DL
GLUCOSE SERPL-MCNC: 115 MG/DL
HBA1C MFR BLD HPLC: 6.9 %
POTASSIUM SERPL-SCNC: 4.4 MMOL/L
PROT SERPL-MCNC: 6.8 G/DL
SODIUM SERPL-SCNC: 142 MMOL/L
T3FREE SERPL-MCNC: 2.8 PG/ML
T4 FREE SERPL-MCNC: 1.1 NG/DL
TSH SERPL-ACNC: 1.01 UIU/ML

## 2023-07-27 LAB
FOLATE SERPL-MCNC: 10.5 NG/ML
VIT B12 SERPL-MCNC: 362 PG/ML

## 2023-07-31 LAB
HOMOCYSTEINE LEVEL: 10.5 UMOL/L
METHYLMALONIC ACID LEVEL: 101 NMOL/L

## 2023-08-31 ENCOUNTER — RX RENEWAL (OUTPATIENT)
Age: 58
End: 2023-08-31

## 2023-09-01 ENCOUNTER — RX RENEWAL (OUTPATIENT)
Age: 58
End: 2023-09-01

## 2023-09-05 ENCOUNTER — RX RENEWAL (OUTPATIENT)
Age: 58
End: 2023-09-05

## 2023-09-06 ENCOUNTER — RX RENEWAL (OUTPATIENT)
Age: 58
End: 2023-09-06

## 2023-09-14 RX ORDER — BENZONATATE 100 MG/1
100 CAPSULE ORAL 3 TIMES DAILY
Qty: 21 | Refills: 0 | Status: COMPLETED | COMMUNITY
Start: 2023-07-25 | End: 2023-09-14

## 2023-09-26 ENCOUNTER — APPOINTMENT (OUTPATIENT)
Dept: CARDIOLOGY | Facility: CLINIC | Age: 58
End: 2023-09-26
Payer: COMMERCIAL

## 2023-09-26 VITALS
TEMPERATURE: 98.2 F | WEIGHT: 208 LBS | DIASTOLIC BLOOD PRESSURE: 91 MMHG | BODY MASS INDEX: 28.17 KG/M2 | OXYGEN SATURATION: 95 % | SYSTOLIC BLOOD PRESSURE: 164 MMHG | HEIGHT: 72 IN | HEART RATE: 63 BPM

## 2023-09-26 PROCEDURE — 99215 OFFICE O/P EST HI 40 MIN: CPT

## 2023-09-28 ENCOUNTER — APPOINTMENT (OUTPATIENT)
Dept: INTERNAL MEDICINE | Facility: CLINIC | Age: 58
End: 2023-09-28
Payer: COMMERCIAL

## 2023-09-28 VITALS
RESPIRATION RATE: 16 BRPM | TEMPERATURE: 98 F | BODY MASS INDEX: 28.44 KG/M2 | OXYGEN SATURATION: 98 % | WEIGHT: 210 LBS | HEART RATE: 80 BPM | HEIGHT: 72 IN

## 2023-09-28 VITALS — DIASTOLIC BLOOD PRESSURE: 72 MMHG | SYSTOLIC BLOOD PRESSURE: 126 MMHG

## 2023-09-28 DIAGNOSIS — R05.9 COUGH, UNSPECIFIED: ICD-10-CM

## 2023-09-28 PROCEDURE — 99213 OFFICE O/P EST LOW 20 MIN: CPT

## 2023-09-28 RX ORDER — FUROSEMIDE 40 MG/1
40 TABLET ORAL DAILY
Qty: 90 | Refills: 1 | Status: DISCONTINUED | COMMUNITY
Start: 2022-06-14 | End: 2023-09-28

## 2023-09-28 RX ORDER — NYSTATIN AND TRIAMCINOLONE ACETONIDE 100000; 1 MG/G; MG/G
100000-0.1 CREAM TOPICAL TWICE DAILY
Qty: 1 | Refills: 0 | Status: ACTIVE | COMMUNITY
Start: 2022-04-04 | End: 1900-01-01

## 2023-10-02 ENCOUNTER — RX RENEWAL (OUTPATIENT)
Age: 58
End: 2023-10-02

## 2023-10-04 ENCOUNTER — APPOINTMENT (OUTPATIENT)
Dept: CARDIOLOGY | Facility: CLINIC | Age: 58
End: 2023-10-04

## 2023-11-01 ENCOUNTER — RX RENEWAL (OUTPATIENT)
Age: 58
End: 2023-11-01

## 2023-11-02 ENCOUNTER — NON-APPOINTMENT (OUTPATIENT)
Age: 58
End: 2023-11-02

## 2023-11-02 ENCOUNTER — APPOINTMENT (OUTPATIENT)
Dept: ELECTROPHYSIOLOGY | Facility: CLINIC | Age: 58
End: 2023-11-02
Payer: COMMERCIAL

## 2023-11-02 VITALS — BODY MASS INDEX: 28.85 KG/M2 | WEIGHT: 213 LBS | HEIGHT: 72 IN

## 2023-11-02 VITALS — HEART RATE: 90 BPM | SYSTOLIC BLOOD PRESSURE: 141 MMHG | DIASTOLIC BLOOD PRESSURE: 89 MMHG | OXYGEN SATURATION: 96 %

## 2023-11-02 PROCEDURE — 99214 OFFICE O/P EST MOD 30 MIN: CPT | Mod: 25

## 2023-11-02 PROCEDURE — 93000 ELECTROCARDIOGRAM COMPLETE: CPT

## 2023-11-27 ENCOUNTER — APPOINTMENT (OUTPATIENT)
Dept: CARDIOLOGY | Facility: CLINIC | Age: 58
End: 2023-11-27
Payer: COMMERCIAL

## 2023-11-27 VITALS
SYSTOLIC BLOOD PRESSURE: 160 MMHG | HEART RATE: 59 BPM | WEIGHT: 205.56 LBS | BODY MASS INDEX: 27.84 KG/M2 | DIASTOLIC BLOOD PRESSURE: 92 MMHG | TEMPERATURE: 98.6 F | HEIGHT: 72 IN | OXYGEN SATURATION: 95 %

## 2023-11-27 PROCEDURE — 99215 OFFICE O/P EST HI 40 MIN: CPT

## 2023-12-04 ENCOUNTER — APPOINTMENT (OUTPATIENT)
Dept: CARDIOLOGY | Facility: CLINIC | Age: 58
End: 2023-12-04

## 2023-12-07 ENCOUNTER — APPOINTMENT (OUTPATIENT)
Dept: HEPATOLOGY | Facility: CLINIC | Age: 58
End: 2023-12-07

## 2023-12-12 ENCOUNTER — APPOINTMENT (OUTPATIENT)
Dept: ENDOCRINOLOGY | Facility: CLINIC | Age: 58
End: 2023-12-12

## 2023-12-15 ENCOUNTER — NON-APPOINTMENT (OUTPATIENT)
Age: 58
End: 2023-12-15

## 2023-12-16 ENCOUNTER — TRANSCRIPTION ENCOUNTER (OUTPATIENT)
Age: 58
End: 2023-12-16

## 2023-12-27 DIAGNOSIS — H92.09 OTALGIA, UNSPECIFIED EAR: ICD-10-CM

## 2024-01-02 ENCOUNTER — RX RENEWAL (OUTPATIENT)
Age: 59
End: 2024-01-02

## 2024-01-02 ENCOUNTER — APPOINTMENT (OUTPATIENT)
Dept: CARDIOLOGY | Facility: CLINIC | Age: 59
End: 2024-01-02

## 2024-02-05 ENCOUNTER — RX RENEWAL (OUTPATIENT)
Age: 59
End: 2024-02-05

## 2024-02-05 RX ORDER — NADOLOL 20 MG/1
20 TABLET ORAL
Qty: 30 | Refills: 5 | Status: ACTIVE | COMMUNITY
Start: 2023-02-07 | End: 1900-01-01

## 2024-02-05 NOTE — HISTORY OF PRESENT ILLNESS
Spoke to patient advised to do 35 units Humalog and 35 units Lantus. Patient understood and will reach out of he has any trouble or questions    [de-identified] : 2 wks of pain in right knee--no recollection of trauma--hx htn and prediabetis

## 2024-03-07 ENCOUNTER — APPOINTMENT (OUTPATIENT)
Dept: CARDIOLOGY | Facility: CLINIC | Age: 59
End: 2024-03-07
Payer: SELF-PAY

## 2024-03-07 ENCOUNTER — NON-APPOINTMENT (OUTPATIENT)
Age: 59
End: 2024-03-07

## 2024-03-07 VITALS
HEIGHT: 73 IN | OXYGEN SATURATION: 96 % | SYSTOLIC BLOOD PRESSURE: 120 MMHG | WEIGHT: 205 LBS | DIASTOLIC BLOOD PRESSURE: 78 MMHG | HEART RATE: 71 BPM | BODY MASS INDEX: 27.17 KG/M2

## 2024-03-07 PROCEDURE — 93000 ELECTROCARDIOGRAM COMPLETE: CPT

## 2024-03-07 PROCEDURE — G2211 COMPLEX E/M VISIT ADD ON: CPT | Mod: NC

## 2024-03-07 PROCEDURE — 99215 OFFICE O/P EST HI 40 MIN: CPT | Mod: 25

## 2024-03-07 NOTE — HISTORY OF PRESENT ILLNESS
[FreeTextEntry1] : 58M w/ DM, HTN afib, presents for f/u PMD: Dr Blake douglas EPS: Dr Martine Box  Previously, pt seen in cardiology for an initial eval 11/2020, with c/o palpitations, and low energy. TTE done at that time showed a LV EF 40%. EKG without arrhythmia, and calcium score was 14. 3/22, feeling well. ekg showing afib ?new. pt not interested in a/c. 8/22, pt in afib, asymptomatic. sent to EP, plan for ablation. 1/23 feeling well. s/p afib ablation. on toprol, on amio, on eliquis. pt saw EP 1/23, amio d/c, will cont on a/c for now. 3/23, feeling well. bp controlled on home log 6/23, feeling well. 9/23 feeling well. HR regular, on bb and a/c. BP elevated on losartan, toprol, lasix changed to chlorthalidone. last seen 11/23, feeling ok, endorsing occasional lightheadedness. HR regular.   pt now presents for follow up. today,  pt considering shoulder sx, would like me to discuss with surgeon. Dr Darshan Krause.  pt overall feeling well. denies cp sob palpitations dizziness, syncope. no edema, orthopnea   pt states he can walk several miles without difficulty.  Prior cardiac workup: none Recent labs: 11/2020: tot chol 157 tg 192 hdl 46 ldl 72 Cr 0.83 GFR 98 a1c 6.7  Med hx: DM2 HTN Sx hx: appendectomy Fam hx: no known cardiac hx. Social hx: lives in [place] with wife and 2 kids. 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 Meds: losartan 100 toprol 50 eliquis 5 metformin, viagra prn prilosec lasix 40 Allergies: nkda

## 2024-03-07 NOTE — DISCUSSION/SUMMARY
[FreeTextEntry1] : 58M w/ DM, HTN afib s/p ablation, presents for f/u  1. Afib s/p ablation -asymptomatic, euvolemic -HR regular today -on eliquis -cont toprol  2. HTN -cont losartan 100 po qd, cont toprol 50, lasix 40  3. pt considering shoulder sx -pt able to ambulate several blocks without cp/sob (>4 mets) -no syncope, vt vf scd -no edema, orthopnea -no sever valvular lesions -pt is low to intermediate risk for low to intermediate risk procedure and is optimized from a cv perspective and may proceed without further cardiac testing -ok to hold eliquis 5 days prior to procedure, cont BB uninterrupted   f/u 6 months unless requires sooner  time spent on complete encounter: 40 min [EKG obtained to assist in diagnosis and management of assessed problem(s)] : EKG obtained to assist in diagnosis and management of assessed problem(s)

## 2024-03-07 NOTE — PHYSICAL EXAM
[Well Developed] : well developed [No Acute Distress] : no acute distress [Well Nourished] : well nourished [Normal Venous Pressure] : normal venous pressure [Normal S1, S2] : normal S1, S2 [No Murmur] : no murmur [Clear Lung Fields] : clear lung fields [Good Air Entry] : good air entry [Soft] : abdomen soft [No Respiratory Distress] : no respiratory distress  [Normal Gait] : normal gait [Non Tender] : non-tender [No Edema] : no edema [No Focal Deficits] : no focal deficits [Alert and Oriented] : alert and oriented

## 2024-03-07 NOTE — REVIEW OF SYSTEMS
[Fever] : no fever [Weight Gain (___ Lbs)] : no recent weight gain [Headache] : no headache [Chills] : no chills [Feeling Fatigued] : not feeling fatigued [Weight Loss (___ Lbs)] : no recent weight loss [Blurry Vision] : no blurred vision [Sore Throat] : no sore throat [SOB] : no shortness of breath [Dyspnea on exertion] : not dyspnea during exertion [Chest Discomfort] : no chest discomfort [Lower Ext Edema] : no extremity edema [Palpitations] : no palpitations [Orthopnea] : no orthopnea [Syncope] : no syncope [Cough] : no cough [Wheezing] : no wheezing [Abdominal Pain] : no abdominal pain [Nausea] : no nausea [Vomiting] : no vomiting [Constipation] : no constipation [Confusion] : no confusion was observed [Easy Bleeding] : no tendency for easy bleeding [Easy Bruising] : no tendency for easy bruising [de-identified] : see hpi

## 2024-03-11 RX ORDER — SILDENAFIL 100 MG/1
100 TABLET, FILM COATED ORAL
Qty: 15 | Refills: 0 | Status: ACTIVE | COMMUNITY
Start: 2020-11-16 | End: 1900-01-01

## 2024-03-11 RX ORDER — APIXABAN 5 MG/1
5 TABLET, FILM COATED ORAL
Qty: 180 | Refills: 1 | Status: ACTIVE | COMMUNITY
Start: 1900-01-01 | End: 1900-01-01

## 2024-03-12 RX ORDER — OMEPRAZOLE 40 MG/1
40 CAPSULE, DELAYED RELEASE ORAL
Qty: 30 | Refills: 0 | Status: ACTIVE | COMMUNITY
Start: 2022-10-12 | End: 1900-01-01

## 2024-03-12 RX ORDER — METOPROLOL SUCCINATE 50 MG/1
50 TABLET, EXTENDED RELEASE ORAL
Qty: 30 | Refills: 11 | Status: ACTIVE | COMMUNITY
Start: 2022-03-18 | End: 1900-01-01

## 2024-03-21 ENCOUNTER — LABORATORY RESULT (OUTPATIENT)
Age: 59
End: 2024-03-21

## 2024-03-21 ENCOUNTER — APPOINTMENT (OUTPATIENT)
Dept: INTERNAL MEDICINE | Facility: CLINIC | Age: 59
End: 2024-03-21
Payer: COMMERCIAL

## 2024-03-21 VITALS
BODY MASS INDEX: 26.51 KG/M2 | WEIGHT: 200 LBS | HEART RATE: 66 BPM | TEMPERATURE: 98 F | HEIGHT: 73 IN | OXYGEN SATURATION: 97 %

## 2024-03-21 DIAGNOSIS — E05.90 THYROTOXICOSIS, UNSPECIFIED W/OUT THYROTOXIC CRISIS OR STORM: ICD-10-CM

## 2024-03-21 DIAGNOSIS — I48.91 UNSPECIFIED ATRIAL FIBRILLATION: ICD-10-CM

## 2024-03-21 DIAGNOSIS — E11.9 TYPE 2 DIABETES MELLITUS W/OUT COMPLICATIONS: ICD-10-CM

## 2024-03-21 PROCEDURE — 99396 PREV VISIT EST AGE 40-64: CPT | Mod: 25

## 2024-03-21 RX ORDER — METFORMIN ER 500 MG 500 MG/1
500 TABLET ORAL
Qty: 360 | Refills: 1 | Status: ACTIVE | COMMUNITY
Start: 2019-08-15 | End: 1900-01-01

## 2024-03-21 NOTE — PHYSICAL EXAM
[No Acute Distress] : no acute distress [Well Nourished] : well nourished [Well Developed] : well developed [Well-Appearing] : well-appearing [Normal Sclera/Conjunctiva] : normal sclera/conjunctiva [PERRL] : pupils equal round and reactive to light [EOMI] : extraocular movements intact [Normal Outer Ear/Nose] : the outer ears and nose were normal in appearance [Normal Oropharynx] : the oropharynx was normal [No JVD] : no jugular venous distention [No Lymphadenopathy] : no lymphadenopathy [Supple] : supple [No Respiratory Distress] : no respiratory distress  [Thyroid Normal, No Nodules] : the thyroid was normal and there were no nodules present [No Accessory Muscle Use] : no accessory muscle use [Clear to Auscultation] : lungs were clear to auscultation bilaterally [Normal Rate] : normal rate  [Regular Rhythm] : with a regular rhythm [Normal S1, S2] : normal S1 and S2 [No Murmur] : no murmur heard [No Carotid Bruits] : no carotid bruits [No Abdominal Bruit] : a ~M bruit was not heard ~T in the abdomen [No Varicosities] : no varicosities [Pedal Pulses Present] : the pedal pulses are present [No Edema] : there was no peripheral edema [No Palpable Aorta] : no palpable aorta [No Extremity Clubbing/Cyanosis] : no extremity clubbing/cyanosis [Soft] : abdomen soft [Non Tender] : non-tender [No Masses] : no abdominal mass palpated [Non-distended] : non-distended [No HSM] : no HSM [Normal Bowel Sounds] : normal bowel sounds [Normal Posterior Cervical Nodes] : no posterior cervical lymphadenopathy [Normal Anterior Cervical Nodes] : no anterior cervical lymphadenopathy [No CVA Tenderness] : no CVA  tenderness [No Joint Swelling] : no joint swelling [No Spinal Tenderness] : no spinal tenderness [Grossly Normal Strength/Tone] : grossly normal strength/tone [No Rash] : no rash [Coordination Grossly Intact] : coordination grossly intact [No Focal Deficits] : no focal deficits [Normal Gait] : normal gait [Deep Tendon Reflexes (DTR)] : deep tendon reflexes were 2+ and symmetric [Normal Insight/Judgement] : insight and judgment were intact [Normal Affect] : the affect was normal

## 2024-03-21 NOTE — HISTORY OF PRESENT ILLNESS
[FreeTextEntry1] : cpx [de-identified] : cpx reviewed cardio notes s/p afib ablation 10/22 confirmed meds

## 2024-03-22 LAB
ALBUMIN SERPL ELPH-MCNC: 3.9 G/DL
ALP BLD-CCNC: 160 U/L
ALT SERPL-CCNC: 79 U/L
ANION GAP SERPL CALC-SCNC: 10 MMOL/L
APPEARANCE: CLEAR
AST SERPL-CCNC: 86 U/L
BACTERIA: NEGATIVE /HPF
BASOPHILS # BLD AUTO: 0.02 K/UL
BASOPHILS NFR BLD AUTO: 0.5 %
BILIRUB SERPL-MCNC: 1.3 MG/DL
BILIRUBIN URINE: NEGATIVE
BLOOD URINE: NEGATIVE
BUN SERPL-MCNC: 9 MG/DL
CALCIUM SERPL-MCNC: 9.3 MG/DL
CAST: 0 /LPF
CHLORIDE SERPL-SCNC: 96 MMOL/L
CHOLEST SERPL-MCNC: 202 MG/DL
CO2 SERPL-SCNC: 28 MMOL/L
COLOR: NORMAL
CREAT SERPL-MCNC: 0.61 MG/DL
EGFR: 111 ML/MIN/1.73M2
EOSINOPHIL # BLD AUTO: 0.06 K/UL
EOSINOPHIL NFR BLD AUTO: 1.4 %
EPITHELIAL CELLS: 0 /HPF
ESTIMATED AVERAGE GLUCOSE: 235 MG/DL
GLUCOSE QUALITATIVE U: >=1000 MG/DL
GLUCOSE SERPL-MCNC: 331 MG/DL
HBA1C MFR BLD HPLC: 9.8 %
HCT VFR BLD CALC: 41.4 %
HDLC SERPL-MCNC: 50 MG/DL
HGB BLD-MCNC: 14.5 G/DL
IMM GRANULOCYTES NFR BLD AUTO: 0.2 %
KETONES URINE: NEGATIVE MG/DL
LDLC SERPL CALC-MCNC: 127 MG/DL
LEUKOCYTE ESTERASE URINE: NEGATIVE
LYMPHOCYTES # BLD AUTO: 1.51 K/UL
LYMPHOCYTES NFR BLD AUTO: 35 %
MAN DIFF?: NORMAL
MCHC RBC-ENTMCNC: 32.9 PG
MCHC RBC-ENTMCNC: 35 GM/DL
MCV RBC AUTO: 93.9 FL
MICROSCOPIC-UA: NORMAL
MONOCYTES # BLD AUTO: 0.61 K/UL
MONOCYTES NFR BLD AUTO: 14.2 %
NEUTROPHILS # BLD AUTO: 2.1 K/UL
NEUTROPHILS NFR BLD AUTO: 48.7 %
NITRITE URINE: NEGATIVE
NONHDLC SERPL-MCNC: 152 MG/DL
PH URINE: 7
PLATELET # BLD AUTO: NORMAL K/UL
POTASSIUM SERPL-SCNC: 3.3 MMOL/L
PROT SERPL-MCNC: 7.1 G/DL
PROTEIN URINE: NEGATIVE MG/DL
PSA SERPL-MCNC: 0.17 NG/ML
RBC # BLD: 4.41 M/UL
RBC # FLD: 13.6 %
RED BLOOD CELLS URINE: 2 /HPF
SODIUM SERPL-SCNC: 135 MMOL/L
SPECIFIC GRAVITY URINE: >1.03
TRIGL SERPL-MCNC: 141 MG/DL
TSH SERPL-ACNC: 1.12 UIU/ML
UROBILINOGEN URINE: 1 MG/DL
WBC # FLD AUTO: 4.31 K/UL
WHITE BLOOD CELLS URINE: 0 /HPF

## 2024-03-22 RX ORDER — CHLORTHALIDONE 25 MG/1
25 TABLET ORAL
Qty: 90 | Refills: 1 | Status: ACTIVE | COMMUNITY
Start: 2023-09-26

## 2024-05-01 ENCOUNTER — APPOINTMENT (OUTPATIENT)
Dept: INTERNAL MEDICINE | Facility: CLINIC | Age: 59
End: 2024-05-01
Payer: COMMERCIAL

## 2024-05-01 VITALS
HEIGHT: 73 IN | HEART RATE: 83 BPM | TEMPERATURE: 98.4 F | SYSTOLIC BLOOD PRESSURE: 123 MMHG | WEIGHT: 191 LBS | OXYGEN SATURATION: 95 % | DIASTOLIC BLOOD PRESSURE: 86 MMHG | BODY MASS INDEX: 25.31 KG/M2

## 2024-05-01 DIAGNOSIS — J01.90 ACUTE SINUSITIS, UNSPECIFIED: ICD-10-CM

## 2024-05-01 PROCEDURE — 99214 OFFICE O/P EST MOD 30 MIN: CPT

## 2024-05-01 RX ORDER — AMOXICILLIN AND CLAVULANATE POTASSIUM 875; 125 MG/1; MG/1
875-125 TABLET, COATED ORAL
Qty: 14 | Refills: 0 | Status: ACTIVE | COMMUNITY
Start: 2024-05-01 | End: 1900-01-01

## 2024-05-01 NOTE — PHYSICAL EXAM
[No Acute Distress] : no acute distress [Well Nourished] : well nourished [Well Developed] : well developed [Well-Appearing] : well-appearing [Normal Sclera/Conjunctiva] : normal sclera/conjunctiva [PERRL] : pupils equal round and reactive to light [EOMI] : extraocular movements intact [Normal Oropharynx] : the oropharynx was normal [No JVD] : no jugular venous distention [No Lymphadenopathy] : no lymphadenopathy [Supple] : supple [No Respiratory Distress] : no respiratory distress  [No Accessory Muscle Use] : no accessory muscle use [Clear to Auscultation] : lungs were clear to auscultation bilaterally [Non-distended] : non-distended [Normal Posterior Cervical Nodes] : no posterior cervical lymphadenopathy [Normal Anterior Cervical Nodes] : no anterior cervical lymphadenopathy [No Joint Swelling] : no joint swelling [Grossly Normal Strength/Tone] : grossly normal strength/tone [No Rash] : no rash [Coordination Grossly Intact] : coordination grossly intact [No Focal Deficits] : no focal deficits [Normal Gait] : normal gait [Speech Grossly Normal] : speech grossly normal [Memory Grossly Normal] : memory grossly normal [Normal Affect] : the affect was normal [Alert and Oriented x3] : oriented to person, place, and time [Normal Mood] : the mood was normal [Normal Insight/Judgement] : insight and judgment were intact [de-identified] : no  oropharyngeal erythema, no tonsillar enlargement bilaterally, no exudate bilaterally, uvula midline. +PND, b/l ear canal erythema. mild sinus tenderness maxillary region [de-identified] : RRR

## 2024-05-01 NOTE — ASSESSMENT
[FreeTextEntry1] : #Acute Sinusitis - Augmentin bid for 7 days with food - denies allergies to penicillin.  - take medication with food - rest drink plenty of fluids - no improvement in 7 days - rto or ENT pt agrees and understands plan via teach back method. all questions answered. A total of 30 minutes including same day pre-visit chart review, face to face time with patient, history taking, physical examination, coordination of care, testing interpretation, and documentation was spent on this visit.

## 2024-05-01 NOTE — HISTORY OF PRESENT ILLNESS
[FreeTextEntry8] : 58 M presents with 4 weeks of sinus congestion +  Rhinitis, +  intermittent cough + Sinus congestion, Denies Chest Congestion Denies Sore Throat Denies Hoarseness, Dysphagia,  Denies fever, body aches earache,  Denies SOB or wheezing or CP or palpitations Denies NVD, abdominal pain Denies HA + covid vaccinated home covid test negative denies any recent travel denies any recent sick contacts denies any loss of taste or smell. pt able to tolerate po denies take otc meds

## 2024-05-01 NOTE — REVIEW OF SYSTEMS
[Earache] : no earache [Hearing Loss] : hearing loss [Nosebleeds] : no nosebleeds [Postnasal Drip] : postnasal drip [Nasal Discharge] : nasal discharge [Sore Throat] : no sore throat [Hoarseness] : no hoarseness [Shortness Of Breath] : no shortness of breath [Wheezing] : no wheezing [Cough] : cough [Dyspnea on Exertion] : not dyspnea on exertion [Negative] : Heme/Lymph [FreeTextEntry4] : sinus/nasal congestion

## 2024-05-01 NOTE — HEALTH RISK ASSESSMENT
[No] : No [No falls in past year] : Patient reported no falls in the past year [0] : 2) Feeling down, depressed, or hopeless: Not at all (0) [PHQ-2 Negative - No further assessment needed] : PHQ-2 Negative - No further assessment needed [Never] : Never [[id number]] : 0

## 2024-05-06 ENCOUNTER — APPOINTMENT (OUTPATIENT)
Dept: ELECTROPHYSIOLOGY | Facility: CLINIC | Age: 59
End: 2024-05-06
Payer: COMMERCIAL

## 2024-05-06 ENCOUNTER — NON-APPOINTMENT (OUTPATIENT)
Age: 59
End: 2024-05-06

## 2024-05-06 VITALS
WEIGHT: 191 LBS | SYSTOLIC BLOOD PRESSURE: 131 MMHG | HEART RATE: 72 BPM | OXYGEN SATURATION: 95 % | BODY MASS INDEX: 25.31 KG/M2 | DIASTOLIC BLOOD PRESSURE: 82 MMHG | HEIGHT: 73 IN

## 2024-05-06 DIAGNOSIS — I48.19 OTHER PERSISTENT ATRIAL FIBRILLATION: ICD-10-CM

## 2024-05-06 PROCEDURE — 93000 ELECTROCARDIOGRAM COMPLETE: CPT

## 2024-05-06 PROCEDURE — 99214 OFFICE O/P EST MOD 30 MIN: CPT | Mod: 25

## 2024-05-06 NOTE — HISTORY OF PRESENT ILLNESS
[FreeTextEntry1] : Dennis Hendricks is a 59y/o man with Hx of HTN, DMII, and persistent afib now s/p PVI and CTI ablation on 10/18/2022, on Amiodarone, who presents today for routine f/u. Admits doing well post procedure. Denies chest pain, palpitations, SOB, syncope or near syncope. Remains on Eliquis for thromboembolic prophylaxis. Denies s/s of bleeding.

## 2024-05-06 NOTE — DISCUSSION/SUMMARY
[EKG obtained to assist in diagnosis and management of assessed problem(s)] : EKG obtained to assist in diagnosis and management of assessed problem(s) [FreeTextEntry1] : Impression:  1. Persistent afib: s/p PVI and CTI ablation on 10/18/2022. EKG performed today to assess for presence of recurrent afib and reveals NSR. Last ECHO with LVEF 40%, LA 50mm. Resume Eliquis for thromboembolic prophylaxis.   2. HTN: resume oral antihypertensives as prescribed. Encouraged heart healthy diet, sodium restriction, and weight loss. Continue regular f/u with Cardiologist for further HTN management.  3. NICM: most recent ECHO in setting of afib with LVEF now 40%. Prior ECHO in 2020 with normal LVEF. Suspect tachy/arrhythmia induced. Resume OMT as prescribed, encouraged heart healthy diet, daily weight, and regular f/u with Cardiology/Heart failure team as scheduled. Plan for repeat ECHO now NSR is established.   Continue f/u with Cardiologist and RTO for f/u in 6 months.

## 2024-05-06 NOTE — CARDIOLOGY SUMMARY
[de-identified] : 6/13/2022: mild mitral insufficiency, mild tricuspid insufficiency, moderate left atrial dilation, mild LV dilation and septal hypokinesis with LVEF 40%, possible left pleural effusion \par  \par  11/2020: Summary:\par  1.Left ventricular ejection fraction, by visual estimation, is 55 to 60%.\par  2.Normal global left ventricular systolic function.\par  3.Increased relative wall thickness with normal mass index consistent with left ventricular \par  concentric remodeling.\par  4.Normal right ventricular size and function.\par  5.Mildly enlarged left atrium.\par  6.Mild mitral valve regurgitation.\par  7.Thickening of the anterior mitral valve leaflet.\par  8.Sclerotic aortic valve with normal opening.\par  9.There is mild  aortic root calcification.

## 2024-05-07 ENCOUNTER — APPOINTMENT (OUTPATIENT)
Dept: INTERNAL MEDICINE | Facility: CLINIC | Age: 59
End: 2024-05-07
Payer: COMMERCIAL

## 2024-05-07 ENCOUNTER — LABORATORY RESULT (OUTPATIENT)
Age: 59
End: 2024-05-07

## 2024-05-07 VITALS
DIASTOLIC BLOOD PRESSURE: 80 MMHG | RESPIRATION RATE: 16 BRPM | HEART RATE: 78 BPM | BODY MASS INDEX: 25.31 KG/M2 | TEMPERATURE: 98.4 F | OXYGEN SATURATION: 95 % | SYSTOLIC BLOOD PRESSURE: 122 MMHG | WEIGHT: 191 LBS | HEIGHT: 73 IN

## 2024-05-07 DIAGNOSIS — I10 ESSENTIAL (PRIMARY) HYPERTENSION: ICD-10-CM

## 2024-05-07 DIAGNOSIS — I42.9 CARDIOMYOPATHY, UNSPECIFIED: ICD-10-CM

## 2024-05-07 DIAGNOSIS — K74.60 UNSPECIFIED CIRRHOSIS OF LIVER: ICD-10-CM

## 2024-05-07 DIAGNOSIS — Z01.818 ENCOUNTER FOR OTHER PREPROCEDURAL EXAMINATION: ICD-10-CM

## 2024-05-07 PROCEDURE — 99214 OFFICE O/P EST MOD 30 MIN: CPT | Mod: 25

## 2024-05-07 PROCEDURE — G2211 COMPLEX E/M VISIT ADD ON: CPT | Mod: NC,1L

## 2024-05-07 PROCEDURE — 36415 COLL VENOUS BLD VENIPUNCTURE: CPT

## 2024-05-07 PROCEDURE — 93000 ELECTROCARDIOGRAM COMPLETE: CPT

## 2024-05-07 RX ORDER — DAPAGLIFLOZIN 5 MG/1
5 TABLET, FILM COATED ORAL
Qty: 90 | Refills: 0 | Status: ACTIVE | COMMUNITY
Start: 2024-03-22 | End: 1900-01-01

## 2024-05-08 LAB
ANION GAP SERPL CALC-SCNC: 14 MMOL/L
BASOPHILS # BLD AUTO: 0.01 K/UL
BASOPHILS NFR BLD AUTO: 0.2 %
BUN SERPL-MCNC: 7 MG/DL
CALCIUM SERPL-MCNC: 10.1 MG/DL
CHLORIDE SERPL-SCNC: 94 MMOL/L
CO2 SERPL-SCNC: 27 MMOL/L
CREAT SERPL-MCNC: 0.54 MG/DL
EGFR: 115 ML/MIN/1.73M2
EOSINOPHIL # BLD AUTO: 0.06 K/UL
EOSINOPHIL NFR BLD AUTO: 1.4 %
GLUCOSE SERPL-MCNC: 207 MG/DL
HCT VFR BLD CALC: 39.2 %
HGB BLD-MCNC: 14 G/DL
IMM GRANULOCYTES NFR BLD AUTO: 0.2 %
LYMPHOCYTES # BLD AUTO: 1.11 K/UL
LYMPHOCYTES NFR BLD AUTO: 25.9 %
MAN DIFF?: NORMAL
MCHC RBC-ENTMCNC: 32.6 PG
MCHC RBC-ENTMCNC: 35.7 GM/DL
MCV RBC AUTO: 91.4 FL
MONOCYTES # BLD AUTO: 0.7 K/UL
MONOCYTES NFR BLD AUTO: 16.4 %
NEUTROPHILS # BLD AUTO: 2.39 K/UL
NEUTROPHILS NFR BLD AUTO: 55.9 %
PLATELET # BLD AUTO: 79 K/UL
POTASSIUM SERPL-SCNC: 3.5 MMOL/L
RBC # BLD: 4.29 M/UL
RBC # FLD: 13.1 %
SODIUM SERPL-SCNC: 135 MMOL/L
WBC # FLD AUTO: 4.28 K/UL

## 2024-05-09 NOTE — ASSESSMENT
[Patient Optimized for Surgery] : Patient optimized for surgery [Modify medications prior to procedure] : Modify medications prior to procedure [FreeTextEntry5] : can hold eliquis 3 days prior to procedure if needs to cease anticoagulation [FreeTextEntry7] : mahendra farixga and metformin am of proc          edure

## 2024-05-09 NOTE — HISTORY OF PRESENT ILLNESS
[Atrial Fibrillation] : atrial fibrillation [No Pertinent Pulmonary History] : no history of asthma, COPD, sleep apnea, or smoking [(Patient denies any chest pain, claudication, dyspnea on exertion, orthopnea, palpitations or syncope)] : Patient denies any chest pain, claudication, dyspnea on exertion, orthopnea, palpitations or syncope [Moderate (4-6 METs)] : Moderate (4-6 METs) [FreeTextEntry1] : Cataract OD [FreeTextEntry2] : 5/15/24 [FreeTextEntry3] : [place] [FreeTextEntry7] : echo 6/22--EF 40%

## 2024-05-09 NOTE — PLAN
[FreeTextEntry1] : labs July NO MEDICAL CONTRAINDICATIONS TO SUGERY PT CAN HOLD ELIQUIS 3 DAYS PRIOR TO PROCEDURE IF REQUIRED BY OPTHO

## 2024-05-24 ENCOUNTER — NON-APPOINTMENT (OUTPATIENT)
Age: 59
End: 2024-05-24

## 2024-05-24 ENCOUNTER — APPOINTMENT (OUTPATIENT)
Dept: CARDIOLOGY | Facility: CLINIC | Age: 59
End: 2024-05-24
Payer: COMMERCIAL

## 2024-05-24 VITALS
DIASTOLIC BLOOD PRESSURE: 85 MMHG | SYSTOLIC BLOOD PRESSURE: 139 MMHG | TEMPERATURE: 98.2 F | WEIGHT: 197 LBS | HEART RATE: 78 BPM | OXYGEN SATURATION: 96 % | HEIGHT: 73 IN | BODY MASS INDEX: 26.11 KG/M2 | RESPIRATION RATE: 16 BRPM

## 2024-05-24 DIAGNOSIS — I48.91 UNSPECIFIED ATRIAL FIBRILLATION: ICD-10-CM

## 2024-05-24 DIAGNOSIS — Z86.79 OTHER SPECIFIED POSTPROCEDURAL STATES: ICD-10-CM

## 2024-05-24 DIAGNOSIS — I10 ESSENTIAL (PRIMARY) HYPERTENSION: ICD-10-CM

## 2024-05-24 DIAGNOSIS — Z98.890 OTHER SPECIFIED POSTPROCEDURAL STATES: ICD-10-CM

## 2024-05-24 PROCEDURE — G2211 COMPLEX E/M VISIT ADD ON: CPT | Mod: NC,1L

## 2024-05-24 PROCEDURE — 99215 OFFICE O/P EST HI 40 MIN: CPT | Mod: 25

## 2024-05-24 PROCEDURE — 93000 ELECTROCARDIOGRAM COMPLETE: CPT

## 2024-05-24 NOTE — REVIEW OF SYSTEMS
[Fever] : no fever [Headache] : no headache [Weight Gain (___ Lbs)] : no recent weight gain [Chills] : no chills [Feeling Fatigued] : not feeling fatigued [Weight Loss (___ Lbs)] : no recent weight loss [Blurry Vision] : no blurred vision [Sore Throat] : no sore throat [SOB] : no shortness of breath [Dyspnea on exertion] : not dyspnea during exertion [Chest Discomfort] : no chest discomfort [Lower Ext Edema] : no extremity edema [Palpitations] : no palpitations [Orthopnea] : no orthopnea [Syncope] : no syncope [Cough] : no cough [Wheezing] : no wheezing [Abdominal Pain] : no abdominal pain [Nausea] : no nausea [Vomiting] : no vomiting [Constipation] : no constipation [Confusion] : no confusion was observed [Easy Bleeding] : no tendency for easy bleeding [Easy Bruising] : no tendency for easy bruising [de-identified] : see hpi

## 2024-05-24 NOTE — DISCUSSION/SUMMARY
[FreeTextEntry1] : 58M w/ DM, HTN afib s/p ablation, presents for f/u  1. Afib s/p ablation -asymptomatic, euvolemic -HR regular today -on eliquis -cont toprol  2. HTN -cont losartan 100 po qd, cont toprol 50, lasix 40  3. plan for shoulder sx -pt able to ambulate several blocks without cp/sob (>4 mets) -no syncope, vt vf scd -no edema, orthopnea -no sever valvular lesions -pt is low to intermediate risk for low to intermediate risk procedure and is optimized from a cv perspective and may proceed without further cardiac testing -ok to hold eliquis 5 days prior to procedure, cont BB uninterrupted   f/u 6 months unless requires sooner  time spent on complete encounter: 40 min [EKG obtained to assist in diagnosis and management of assessed problem(s)] : EKG obtained to assist in diagnosis and management of assessed problem(s)

## 2024-05-24 NOTE — HISTORY OF PRESENT ILLNESS
[FreeTextEntry1] : 58M w/ DM, HTN afib, presents for f/u PMD: Dr Blake douglas EPS: Dr Martine Box  Previously, pt seen in cardiology for an initial eval 11/2020, with c/o palpitations, and low energy. TTE done at that time showed a LV EF 40%. EKG without arrhythmia, and calcium score was 14. 3/22, feeling well. ekg showing afib ?new. pt not interested in a/c. 8/22, pt in afib, asymptomatic. sent to EP, plan for ablation. 1/23 feeling well. s/p afib ablation. on toprol, on amio, on eliquis. pt saw EP 1/23, amio d/c, will cont on a/c for now. 3/23, feeling well. bp controlled on home log 6/23, feeling well. 9/23 feeling well. HR regular, on bb and a/c. BP elevated on losartan, toprol, lasix changed to chlorthalidone. 11/23, feeling ok, endorsing occasional lightheadedness. HR regular. last seen 3/24, feeling well, considering shoulder sx. optimized for it, if he decided to pursue it.   pt now presents for follow up. today,  pt planning  shoulder sx, Dr Darshan Krause.  pt overall feeling well. denies cp sob palpitations dizziness, syncope. no edema, orthopnea pt states he can walk several miles without difficulty. no syncope, vt vf scd no edema, orthopnea no hx of severe valvular lesions on maggie, no murmurs on exam  Prior cardiac workup: none Recent labs: 11/2020: tot chol 157 tg 192 hdl 46 ldl 72 Cr 0.83 GFR 98 a1c 6.7  Med hx: DM2 HTN Sx hx: appendectomy Fam hx: no known cardiac hx. Social hx: lives in [place] with wife and 2 kids. 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 Meds: losartan 100 toprol 50 eliquis 5 metformin, viagra prn prilosec lasix 40 Allergies: nkda

## 2024-06-13 ENCOUNTER — APPOINTMENT (OUTPATIENT)
Dept: HEPATOLOGY | Facility: CLINIC | Age: 59
End: 2024-06-13

## 2024-07-08 ENCOUNTER — RX RENEWAL (OUTPATIENT)
Age: 59
End: 2024-07-08

## 2024-07-12 ENCOUNTER — APPOINTMENT (OUTPATIENT)
Dept: INTERNAL MEDICINE | Facility: CLINIC | Age: 59
End: 2024-07-12
Payer: COMMERCIAL

## 2024-07-12 VITALS
TEMPERATURE: 97.3 F | OXYGEN SATURATION: 97 % | BODY MASS INDEX: 25.05 KG/M2 | RESPIRATION RATE: 16 BRPM | HEIGHT: 73 IN | WEIGHT: 189 LBS | SYSTOLIC BLOOD PRESSURE: 134 MMHG | DIASTOLIC BLOOD PRESSURE: 81 MMHG | HEART RATE: 87 BPM

## 2024-07-12 DIAGNOSIS — I48.91 UNSPECIFIED ATRIAL FIBRILLATION: ICD-10-CM

## 2024-07-12 DIAGNOSIS — I10 ESSENTIAL (PRIMARY) HYPERTENSION: ICD-10-CM

## 2024-07-12 DIAGNOSIS — E11.9 TYPE 2 DIABETES MELLITUS W/OUT COMPLICATIONS: ICD-10-CM

## 2024-07-12 PROCEDURE — G2211 COMPLEX E/M VISIT ADD ON: CPT | Mod: NC

## 2024-07-12 PROCEDURE — 99214 OFFICE O/P EST MOD 30 MIN: CPT | Mod: 25

## 2024-07-15 LAB
ALBUMIN SERPL ELPH-MCNC: 3.8 G/DL
ALT SERPL-CCNC: 84 U/L
ANION GAP SERPL CALC-SCNC: 12 MMOL/L
AST SERPL-CCNC: 105 U/L
BILIRUB SERPL-MCNC: 1.5 MG/DL
BUN SERPL-MCNC: 11 MG/DL
CALCIUM SERPL-MCNC: 9.3 MG/DL
CHLORIDE SERPL-SCNC: 97 MMOL/L
CO2 SERPL-SCNC: 30 MMOL/L
CREAT SERPL-MCNC: 0.68 MG/DL
EGFR: 107 ML/MIN/1.73M2
ESTIMATED AVERAGE GLUCOSE: 148 MG/DL
FRUCTOSAMINE SERPL-MCNC: 351 UMOL/L
HBA1C MFR BLD HPLC: 6.8 %
POTASSIUM SERPL-SCNC: 3.7 MMOL/L
SODIUM SERPL-SCNC: 139 MMOL/L

## 2024-09-16 ENCOUNTER — RX RENEWAL (OUTPATIENT)
Age: 59
End: 2024-09-16

## 2024-10-11 ENCOUNTER — RX RENEWAL (OUTPATIENT)
Age: 59
End: 2024-10-11

## 2024-10-14 ENCOUNTER — APPOINTMENT (OUTPATIENT)
Dept: INTERNAL MEDICINE | Facility: CLINIC | Age: 59
End: 2024-10-14
Payer: COMMERCIAL

## 2024-10-14 VITALS
DIASTOLIC BLOOD PRESSURE: 76 MMHG | OXYGEN SATURATION: 96 % | WEIGHT: 189.56 LBS | HEART RATE: 71 BPM | HEIGHT: 73 IN | BODY MASS INDEX: 25.12 KG/M2 | TEMPERATURE: 98.1 F | SYSTOLIC BLOOD PRESSURE: 120 MMHG

## 2024-10-14 DIAGNOSIS — I10 ESSENTIAL (PRIMARY) HYPERTENSION: ICD-10-CM

## 2024-10-14 DIAGNOSIS — Z86.79 OTHER SPECIFIED POSTPROCEDURAL STATES: ICD-10-CM

## 2024-10-14 DIAGNOSIS — Z01.818 ENCOUNTER FOR OTHER PREPROCEDURAL EXAMINATION: ICD-10-CM

## 2024-10-14 DIAGNOSIS — E11.9 TYPE 2 DIABETES MELLITUS W/OUT COMPLICATIONS: ICD-10-CM

## 2024-10-14 DIAGNOSIS — Z98.890 OTHER SPECIFIED POSTPROCEDURAL STATES: ICD-10-CM

## 2024-10-14 PROCEDURE — G2211 COMPLEX E/M VISIT ADD ON: CPT | Mod: NC

## 2024-10-14 PROCEDURE — 99214 OFFICE O/P EST MOD 30 MIN: CPT

## 2024-10-23 ENCOUNTER — NON-APPOINTMENT (OUTPATIENT)
Age: 59
End: 2024-10-23

## 2024-10-23 ENCOUNTER — APPOINTMENT (OUTPATIENT)
Dept: CARDIOLOGY | Facility: CLINIC | Age: 59
End: 2024-10-23
Payer: COMMERCIAL

## 2024-10-23 VITALS
BODY MASS INDEX: 25.84 KG/M2 | HEIGHT: 73 IN | OXYGEN SATURATION: 99 % | WEIGHT: 195 LBS | SYSTOLIC BLOOD PRESSURE: 131 MMHG | HEART RATE: 63 BPM | DIASTOLIC BLOOD PRESSURE: 80 MMHG

## 2024-10-23 DIAGNOSIS — I48.91 UNSPECIFIED ATRIAL FIBRILLATION: ICD-10-CM

## 2024-10-23 PROCEDURE — 99215 OFFICE O/P EST HI 40 MIN: CPT

## 2024-10-23 PROCEDURE — G2211 COMPLEX E/M VISIT ADD ON: CPT | Mod: NC

## 2024-10-23 PROCEDURE — 93000 ELECTROCARDIOGRAM COMPLETE: CPT

## 2024-10-28 LAB
ALBUMIN SERPL ELPH-MCNC: 3.7 G/DL
ALP BLD-CCNC: 103 U/L
ALT SERPL-CCNC: 65 U/L
ANION GAP SERPL CALC-SCNC: 13 MMOL/L
APTT BLD: 35.8 SEC
AST SERPL-CCNC: 76 U/L
BASOPHILS # BLD AUTO: 0.01 K/UL
BASOPHILS NFR BLD AUTO: 0.3 %
BILIRUB SERPL-MCNC: 1.4 MG/DL
BUN SERPL-MCNC: 14 MG/DL
CALCIUM SERPL-MCNC: 9.1 MG/DL
CHLORIDE SERPL-SCNC: 101 MMOL/L
CO2 SERPL-SCNC: 27 MMOL/L
CREAT SERPL-MCNC: 0.77 MG/DL
EGFR: 102 ML/MIN/1.73M2
EOSINOPHIL # BLD AUTO: 0.1 K/UL
EOSINOPHIL NFR BLD AUTO: 3 %
GLUCOSE SERPL-MCNC: 110 MG/DL
HCT VFR BLD CALC: 36.7 %
HGB BLD-MCNC: 12.3 G/DL
IMM GRANULOCYTES NFR BLD AUTO: 0.3 %
INR PPP: 1.18 RATIO
LYMPHOCYTES # BLD AUTO: 1.26 K/UL
LYMPHOCYTES NFR BLD AUTO: 37.8 %
MAN DIFF?: NORMAL
MCHC RBC-ENTMCNC: 31.8 PG
MCHC RBC-ENTMCNC: 33.5 GM/DL
MCV RBC AUTO: 94.8 FL
MONOCYTES # BLD AUTO: 0.43 K/UL
MONOCYTES NFR BLD AUTO: 12.9 %
NEUTROPHILS # BLD AUTO: 1.52 K/UL
NEUTROPHILS NFR BLD AUTO: 45.7 %
PLATELET # BLD AUTO: 48 K/UL
POTASSIUM SERPL-SCNC: 3.2 MMOL/L
PROT SERPL-MCNC: 7.1 G/DL
PT BLD: 13.9 SEC
RBC # BLD: 3.87 M/UL
RBC # FLD: 14.2 %
SODIUM SERPL-SCNC: 141 MMOL/L
WBC # FLD AUTO: 3.33 K/UL

## 2024-11-19 ENCOUNTER — RX RENEWAL (OUTPATIENT)
Age: 59
End: 2024-11-19

## 2024-12-17 ENCOUNTER — LABORATORY RESULT (OUTPATIENT)
Age: 59
End: 2024-12-17

## 2024-12-17 ENCOUNTER — APPOINTMENT (OUTPATIENT)
Dept: HEPATOLOGY | Facility: CLINIC | Age: 59
End: 2024-12-17
Payer: COMMERCIAL

## 2024-12-17 VITALS
WEIGHT: 200 LBS | HEIGHT: 73 IN | HEART RATE: 77 BPM | BODY MASS INDEX: 26.51 KG/M2 | DIASTOLIC BLOOD PRESSURE: 81 MMHG | SYSTOLIC BLOOD PRESSURE: 149 MMHG | OXYGEN SATURATION: 98 % | RESPIRATION RATE: 16 BRPM | TEMPERATURE: 98.8 F

## 2024-12-17 DIAGNOSIS — Z87.898 PERSONAL HISTORY OF OTHER SPECIFIED CONDITIONS: ICD-10-CM

## 2024-12-17 DIAGNOSIS — Z86.39 PERSONAL HISTORY OF OTHER ENDOCRINE, NUTRITIONAL AND METABOLIC DISEASE: ICD-10-CM

## 2024-12-17 DIAGNOSIS — K70.31 ALCOHOLIC CIRRHOSIS OF LIVER WITH ASCITES: ICD-10-CM

## 2024-12-17 DIAGNOSIS — K86.9 DISEASE OF PANCREAS, UNSPECIFIED: ICD-10-CM

## 2024-12-17 DIAGNOSIS — K74.60 UNSPECIFIED CIRRHOSIS OF LIVER: ICD-10-CM

## 2024-12-17 DIAGNOSIS — K64.9 UNSPECIFIED HEMORRHOIDS: ICD-10-CM

## 2024-12-17 DIAGNOSIS — K63.5 POLYP OF COLON: ICD-10-CM

## 2024-12-17 DIAGNOSIS — Z79.01 LONG TERM (CURRENT) USE OF ANTICOAGULANTS: ICD-10-CM

## 2024-12-17 DIAGNOSIS — F10.20 ALCOHOL DEPENDENCE, UNCOMPLICATED: ICD-10-CM

## 2024-12-17 DIAGNOSIS — I85.00 ESOPHAGEAL VARICES W/OUT BLEEDING: ICD-10-CM

## 2024-12-17 DIAGNOSIS — K25.9 GASTRIC ULCER, UNSPECIFIED AS ACUTE OR CHRONIC, W/OUT HEMORRHAGE OR PERFORATION: ICD-10-CM

## 2024-12-17 DIAGNOSIS — E66.3 OVERWEIGHT: ICD-10-CM

## 2024-12-17 DIAGNOSIS — I42.8 OTHER CARDIOMYOPATHIES: ICD-10-CM

## 2024-12-17 DIAGNOSIS — I86.4 ESOPHAGEAL VARICES W/OUT BLEEDING: ICD-10-CM

## 2024-12-17 LAB
AFP-TM SERPL-MCNC: 4.1 NG/ML
ALBUMIN SERPL ELPH-MCNC: 3.8 G/DL
ALP BLD-CCNC: 127 U/L
ALT SERPL-CCNC: 68 U/L
ANION GAP SERPL CALC-SCNC: 10 MMOL/L
AST SERPL-CCNC: 124 U/L
BILIRUB SERPL-MCNC: 1.2 MG/DL
BUN SERPL-MCNC: 8 MG/DL
CALCIUM SERPL-MCNC: 9.1 MG/DL
CHLORIDE SERPL-SCNC: 104 MMOL/L
CO2 SERPL-SCNC: 29 MMOL/L
CREAT SERPL-MCNC: 0.64 MG/DL
EGFR: 108 ML/MIN/1.73M2
GLUCOSE SERPL-MCNC: 152 MG/DL
POTASSIUM SERPL-SCNC: 3.9 MMOL/L
PROT SERPL-MCNC: 7.2 G/DL
SODIUM SERPL-SCNC: 144 MMOL/L

## 2024-12-17 PROCEDURE — 99215 OFFICE O/P EST HI 40 MIN: CPT

## 2024-12-17 PROCEDURE — G2211 COMPLEX E/M VISIT ADD ON: CPT | Mod: NC

## 2024-12-17 RX ORDER — SODIUM SULFATE, POTASSIUM SULFATE AND MAGNESIUM SULFATE 1.6; 3.13; 17.5 G/177ML; G/177ML; G/177ML
17.5-3.13-1.6 SOLUTION ORAL
Qty: 2 | Refills: 0 | Status: ACTIVE | COMMUNITY
Start: 2024-12-17 | End: 1900-01-01

## 2024-12-18 LAB
BASOPHILS # BLD AUTO: 0.01 K/UL
BASOPHILS NFR BLD AUTO: 0.4 %
EOSINOPHIL # BLD AUTO: 0.02 K/UL
EOSINOPHIL NFR BLD AUTO: 0.8 %
HCT VFR BLD CALC: 35.8 %
HGB BLD-MCNC: 11.8 G/DL
IMM GRANULOCYTES NFR BLD AUTO: 0 %
INR PPP: 1.03 RATIO
LYMPHOCYTES # BLD AUTO: 0.98 K/UL
LYMPHOCYTES NFR BLD AUTO: 40.2 %
MAN DIFF?: NORMAL
MCHC RBC-ENTMCNC: 31.1 PG
MCHC RBC-ENTMCNC: 33 G/DL
MCV RBC AUTO: 94.5 FL
MONOCYTES # BLD AUTO: 0.45 K/UL
MONOCYTES NFR BLD AUTO: 18.4 %
NEUTROPHILS # BLD AUTO: 0.98 K/UL
NEUTROPHILS NFR BLD AUTO: 40.2 %
PLATELET # BLD AUTO: 43 K/UL
PT BLD: 12.2 SEC
RBC # BLD: 3.79 M/UL
RBC # FLD: 14.9 %
WBC # FLD AUTO: 2.44 K/UL

## 2024-12-30 ENCOUNTER — APPOINTMENT (OUTPATIENT)
Dept: CARDIOLOGY | Facility: CLINIC | Age: 59
End: 2024-12-30
Payer: COMMERCIAL

## 2024-12-30 ENCOUNTER — NON-APPOINTMENT (OUTPATIENT)
Age: 59
End: 2024-12-30

## 2024-12-30 VITALS
BODY MASS INDEX: 26.51 KG/M2 | SYSTOLIC BLOOD PRESSURE: 148 MMHG | RESPIRATION RATE: 16 BRPM | HEART RATE: 87 BPM | OXYGEN SATURATION: 97 % | HEIGHT: 73 IN | WEIGHT: 200 LBS | DIASTOLIC BLOOD PRESSURE: 76 MMHG

## 2024-12-30 DIAGNOSIS — I48.91 UNSPECIFIED ATRIAL FIBRILLATION: ICD-10-CM

## 2024-12-30 DIAGNOSIS — I10 ESSENTIAL (PRIMARY) HYPERTENSION: ICD-10-CM

## 2024-12-30 PROCEDURE — G2211 COMPLEX E/M VISIT ADD ON: CPT | Mod: NC

## 2024-12-30 PROCEDURE — 93000 ELECTROCARDIOGRAM COMPLETE: CPT

## 2024-12-30 PROCEDURE — 99215 OFFICE O/P EST HI 40 MIN: CPT

## 2025-01-13 ENCOUNTER — NON-APPOINTMENT (OUTPATIENT)
Age: 60
End: 2025-01-13

## 2025-01-13 ENCOUNTER — OUTPATIENT (OUTPATIENT)
Dept: OUTPATIENT SERVICES | Facility: HOSPITAL | Age: 60
LOS: 1 days | End: 2025-01-13

## 2025-01-13 VITALS
HEIGHT: 72 IN | HEART RATE: 58 BPM | TEMPERATURE: 98 F | SYSTOLIC BLOOD PRESSURE: 135 MMHG | OXYGEN SATURATION: 98 % | RESPIRATION RATE: 16 BRPM | DIASTOLIC BLOOD PRESSURE: 81 MMHG | WEIGHT: 197.98 LBS

## 2025-01-13 DIAGNOSIS — I85.00 ESOPHAGEAL VARICES WITHOUT BLEEDING: ICD-10-CM

## 2025-01-13 DIAGNOSIS — I48.91 UNSPECIFIED ATRIAL FIBRILLATION: ICD-10-CM

## 2025-01-13 DIAGNOSIS — Z90.49 ACQUIRED ABSENCE OF OTHER SPECIFIED PARTS OF DIGESTIVE TRACT: Chronic | ICD-10-CM

## 2025-01-13 DIAGNOSIS — E11.9 TYPE 2 DIABETES MELLITUS WITHOUT COMPLICATIONS: ICD-10-CM

## 2025-01-13 RX ORDER — OMEPRAZOLE MAGNESIUM 20 MG/1
1 CAPSULE, DELAYED RELEASE ORAL
Refills: 0 | DISCHARGE

## 2025-01-13 RX ORDER — DAPAGLIFLOZIN 5 MG/1
1 TABLET, FILM COATED ORAL
Refills: 0 | DISCHARGE

## 2025-01-13 RX ORDER — CHLORTHALIDONE 25 MG/1
1 TABLET ORAL
Refills: 0 | DISCHARGE

## 2025-01-13 NOTE — H&P PST ADULT - PROBLEM SELECTOR PLAN 1
Patient tentatively scheduled for colonoscopy and endoscopy     Pre-op instructions provided. Teach-back method was utilized to assess patient's understanding. Patient verbalized understanding.    Pt will take own on the morning of procedure for GI prophylaxis.    copy of CBC, CMP and coags in the chart

## 2025-01-13 NOTE — H&P PST ADULT - NS PRO FEM  PAP SMEARS 3YRS
Keep the dressing clean, dry, intact for 24 hours.  Thereafter may gently remove.  May clean with mild soap and water and pat to dry.  Use an over-the-counter topical antibiotic like Neosporin or bacitracin to prevent infection.  Suture removal in 12 to 14 days.  May return to this ER or another healthcare facility with ability to remove sutures.  Return sooner for new or worsening symptoms, especially signs of infection such as warmth, or redness, puslike drainage.  Take the antibiotics as prescribed.   not applicable (Male)

## 2025-01-13 NOTE — H&P PST ADULT - NEGATIVE PSYCHIATRIC SYMPTOMS
[facility] Neurology  Consult Note    Patient Name: Guicho Blanco  : 1941  MRN: [id number]  Primary Care Physician:  Mitchel Culver MD  Referring Physician: No ref. provider found  Date of admission: 2024    Subjective     Reason for Consult: History of TIAs and progressive weakness    Guicho Blanco is a 82 y.o. male with history of hearing loss, peripheral neuropathy, TIA, atrial fibrillation, CAD, hypertension, hyperlipidemia, right knee arthroplasty complicated by cellulitis who is presenting with increased confusion    Patient presented to [facility] ED on  with increased confusion.  He was initially at [facility] for total knee arthroplasty on .  He was transferred to [facility] on 8/10 where he stayed for about 10 days.  He was doing well however noticed redness to his knee incision was started on antibiotics per infectious disease.  He was then discharged home on .  He was seen again by ID as well as orthopedics for concern for redness and swelling of his incision site.  He was switched to oral linezolid on .  He then presented to the ER due to increased confusion on .    No family at bedside.  Patient reports episodic confusion.  He is only able to tell me about 1 time where he had his TV remote and the remote to an electric chair and could not figure out how to use either one of them.  He notes his wife has some other episodes.  He also told me about an episode of passing out while getting out of his car when coming to the ER.    He also says he has bone spurs and that is affecting his swallowing.  He has been having trouble with solid foods, no issues with liquids.  That has been going on for several months.  His appetite is very poor right now.  He is not really sure why.    He says his sleep is fine for the last several nights.  He recently moved from sleeping in the recliner to his bed.    Review Of Systems   Constitutional:   Cardiovascular: Negative for  chest pain or palpitations.  Respiratory: Negative for shortness of breath or cough.  Gastrointestinal: Negative for nausea and vomiting.  Genitourinary: Negative for bladder incontinence.  Musculoskeletal: Negative for aches and pains in the muscles or joints.  Dermatological: Negative for skin breakdown.   Neurological: Negative for headache, pain, weakness or vision changes.     Personal History     Past Medical History:   Diagnosis Date    Arthritis     Atrial fibrillation     BPH (benign prostatic hypertrophy)     Cataract     Cellulitis     Cochlear implant in place     bilateral    Coronary artery disease     Full dentures     Gall stones     GERD (gastroesophageal reflux disease)     Hearing loss     Hiatal hernia     Hyperlipidemia     Hypertension     Peripheral neuropathy     Peripheral vascular disease     Seasonal allergies     Skin cancer     squamous cell removed from back    TIA (transient ischemic attack)     2 times    Wears glasses        Past Surgical History:   Procedure Laterality Date    ABLATION OF DYSRHYTHMIC FOCUS      APPENDECTOMY      CARDIAC CATHETERIZATION N/A 10/05/2017    Procedure: Left Heart Cath;  Surgeon: Hector Urrutia MD;  Location:  [facility];  Service:     CATARACT EXTRACTION Bilateral     CHOLECYSTECTOMY      Remote    COCHLEAR IMPLANT REVISION  05/2019    COLONOSCOPY      PROSTATE SURGERY      SHOULDER SURGERY Right     SKIN BIOPSY      TOE AMPUTATION Right     2nd toe - Right Foot    TONSILLECTOMY      TOTAL KNEE ARTHROPLASTY Right 8/7/2024    Procedure: TOTAL KNEE ARTHROPLASTY WITH ASHLEY ROBOT RIGHT;  Surgeon: Christian Schwartz MD;  Location:  [facility] OR;  Service: Robotics - Ortho;  Laterality: Right;       Family History: family history includes Atrial fibrillation in his mother; Dementia in his mother; Lung cancer in his father. Otherwise pertinent FHx was reviewed and not pertinent to current issue.    Social History:  reports that he has never  smoked. He has been exposed to tobacco smoke. He has never used smokeless tobacco. He reports that he does not drink alcohol and does not use drugs.    Home Medications:   acetaminophen, aspirin, bisacodyl, coenzyme Q10, cycloSPORINE, docusate sodium, flecainide, glucose, isosorbide mononitrate, linezolid, nitroglycerin, nortriptyline, omeprazole OTC, pravastatin, saccharomyces boulardii, traMADol, vitamin D3, and vitamin b complex    Current Medications:     Current Facility-Administered Medications:     acetaminophen (TYLENOL) tablet 1,000 mg, 1,000 mg, Oral, Q6H PRN, Berto Liu MD    aspirin tablet 325 mg, 325 mg, Oral, Daily, Berto Liu MD, 325 mg at 09/14/24 0859    polyethylene glycol (MIRALAX) packet 17 g, 17 g, Oral, Daily PRN **AND** bisacodyl (DULCOLAX) EC tablet 5 mg, 5 mg, Oral, Daily PRN **AND** bisacodyl (DULCOLAX) suppository 10 mg, 10 mg, Rectal, Daily PRN, Berto Liu MD    cefTRIAXone (ROCEPHIN) 2,000 mg in sodium chloride 0.9 % 100 mL MBP, 2,000 mg, Intravenous, Q24H, Jae Maznanares MD    docusate sodium (COLACE) capsule 100 mg, 100 mg, Oral, BID PRN, Berto Liu MD    flecainide (TAMBOCOR) tablet 100 mg, 100 mg, Oral, Q12H, eBrto Liu MD, 100 mg at 09/14/24 0534    isosorbide mononitrate (IMDUR) 24 hr tablet 30 mg, 30 mg, Oral, Daily, Berto Liu MD, 30 mg at 09/14/24 0858    nitroglycerin (NITROSTAT) SL tablet 0.4 mg, 0.4 mg, Sublingual, Q5 Min PRN, Berto Liu MD    nortriptyline (PAMELOR) capsule 20 mg, 20 mg, Oral, Nightly, Berto Liu MD, 20 mg at 09/13/24 2158    pantoprazole (PROTONIX) EC tablet 40 mg, 40 mg, Oral, Q AM, Berto Liu MD, 40 mg at 09/14/24 0534    Pharmacy to dose vancomycin, , Does not apply, Continuous PRN, Jae Manzanares MD    pravastatin (PRAVACHOL) tablet 20 mg, 20 mg, Oral, Daily, Berto Liu MD, 20 mg at 09/14/24 0864    saccharomyces boulardii (FLORASTOR) capsule 250 mg, 250  mg, Oral, BID, Berto Liu MD, 250 mg at 09/14/24 0858    sodium chloride 0.9 % flush 10 mL, 10 mL, Intravenous, Q12H, Berto Liu MD, 10 mL at 09/14/24 0859    sodium chloride 0.9 % flush 10 mL, 10 mL, Intravenous, PRN, Berto Liu MD    sodium chloride 0.9 % infusion 40 mL, 40 mL, Intravenous, PRN, Berto Liu MD    vancomycin IVPB 1500 mg in 0.9% NaCl (Premix) 500 mL, 1,500 mg, Intravenous, Q24H, Charlene Wyatt, [facility]     Allergies:  Allergies   Allergen Reactions    Divalproex Sodium (Migraine) [Valproic Acid] Other (See Comments)     Weak, lethargy and got worse    Cymbalta [Duloxetine Hcl] Mental Status Change     Depression, thoughts of Suicide.     Duloxetine Unknown (See Comments)    Metoprolol Other (See Comments)     UNKNOWN. Pt does not remember this being allergy    Neurontin [Gabapentin] Other (See Comments)     Lethargy & fatigue       Objective     Vitals:  Temp:  [97.2 °F (36.2 °C)-98 °F (36.7 °C)] 97.5 °F (36.4 °C)  Heart Rate:  [79-91] 91  Resp:  [16-18] 16  BP: (118-138)/(60-77) 136/70    Neurologic Exam   Mental status/Cognition: Initially asleep, wakes easily to voice, oriented to self, place, month, year, able to state days of the week backwards  Speech/language: fluent; follows commands    Cranial nerves: PERRL. EOMI. right lower facial droop at rest.  Facial sensation intact bilaterally in V1, V2, V3.  No dysarthria.  Shoulder shrug symmetric.  Tongue protrudes midline.    Motor: No drift in any extremities, able to raise all to antigravity.    Sensation: intact to light touch throughout    Reflexes: 1+ at bilateral biceps, brachioradialis, left patellas, achilles.  Did not assess right patella due to recent surgery and infection    Coordination/Complex Motor: Finger-to-nose intact bilaterally without dysmetria        Laboratory Results:   Lab Results   Component Value Date    GLUCOSE 88 09/13/2024    CALCIUM 9.2 09/13/2024     09/13/2024    K 3.9  09/13/2024    CO2 24.0 09/13/2024     09/13/2024    BUN 18 09/13/2024    CREATININE 0.97 09/13/2024    EGFRIFNONA 69 06/19/2021    BCR 18.6 09/13/2024    ANIONGAP 12.0 09/13/2024     Lab Results   Component Value Date    WBC 7.23 09/13/2024    HGB 10.9 (L) 09/13/2024    HCT 32 (L) 09/13/2024    MCV 86.4 09/13/2024     09/13/2024     Lab Results   Component Value Date    CHOL 97 06/19/2021    CHOL 128 10/19/2017    CHOL 142 10/05/2017     Lab Results   Component Value Date    HDL 36 (L) 06/19/2021    HDL 38 (L) 10/19/2017    HDL 43 10/05/2017     Lab Results   Component Value Date    LDL 45 06/19/2021    LDL 83 10/19/2017    LDL 91 10/05/2017     Lab Results   Component Value Date    TRIG 75 06/19/2021    TRIG 77 10/19/2017    TRIG 116 10/05/2017     Lab Results   Component Value Date    HGBA1C 5.20 07/30/2024     Lab Results   Component Value Date    IHASWVGP52 535 05/27/2022     Lab Results   Component Value Date    FOLATE >20.0 05/27/2022         Images/Procedures   CT head 8/22/2024  Moderate chronic small vessel ischemic change and chronic lacunar infarcts    Assessment / Plan   Active Hospital Problems:  Suspected delirium  TIA      Brief Patient Summary:  Guicho Blanco is a 82 y.o. male with history of hearing loss, peripheral neuropathy, TIA, atrial fibrillation, CAD, hypertension, hyperlipidemia, right knee arthroplasty complicated by cellulitis who is presenting with increased confusion.  His exam is intact, he does have what looks to be a right facial droop at rest.  He is also reporting decreased appetite and difficulty swallowing.  I suspect a delirium component due to his multiple hospitalizations, infections, poor sleep and poor nutrition.  He is on nortriptyline and was started briefly on linezolid, these can have serotonergic effects unsure if this was contributing, but he is no longer on linezolid and episodic confusion persists.  No other signs or symptoms of serotonin syndrome.   Will rule out stroke with MRI brain.  Will start Remeron to help with sleep as well as hopefully boost his appetite.    Plan:   -MRI brain without contrast  -Start Remeron 15 mg nightly  -Recommend PT/OT/SLP    I have discussed the above with the patient, bedside RN  Time spent with patient: 60 minutes in face-to-face evaluation and management of the patient.       Akhil Lira, DO           no suicidal ideation/no depression

## 2025-01-13 NOTE — H&P PST ADULT - NSICDXPASTMEDICALHX_GEN_ALL_CORE_FT
PAST MEDICAL HISTORY:  A-fib     DM (diabetes mellitus)     ETOH abuse     GERD (gastroesophageal reflux disease)     HTN (hypertension)     Liver cirrhosis     Nonischemic cardiomyopathy     Pancreatic lesion     Polyp of colon     Thrombocytopenia

## 2025-01-13 NOTE — H&P PST ADULT - PROBLEM SELECTOR PLAN 2
Patient instructed to take metoprolol with a sip of water on the morning of procedure.    Patient was instructed hold Eliquis 5 days prior to procedure as per cardio, Last dose 1/16/25    copy EKG, ECHO, cardiac note in the chart Patient instructed to take metoprolol with a sip of water on the morning of procedure.    Patient was instructed hold Eliquis 5 days prior to procedure as per cardio, Last dose 1/16/25    copy EKG, ECHO, cardiac eval in the chart-- patient optimized for the procedure

## 2025-01-13 NOTE — H&P PST ADULT - PROBLEM SELECTOR PLAN 3
Patient instructed to hold Metformin the night before and the morning of procedure. Pt stated understanding.    Patient was instructed hold the medication Farxiga 3 days prior to procedure, Last dose last dose 1/18/24 Patient instructed to hold Metformin the morning of procedure. Pt stated understanding.    Patient was instructed hold the medication Farxiga 3 days prior to procedure, Last dose last dose 1/18/24

## 2025-01-13 NOTE — H&P PST ADULT - HISTORY OF PRESENT ILLNESS
59 year old male with PMH of HTN, HDL, Type 2 DM, Afib on Eliquis Non ischemic cardiomyopathy EF 40% , ETOH abuse, liver cirrhosis, esophageal varices and pancreatic lesion. patient c/o occasional abdominal discomfort, Patient scheduled to undergo colonoscopy and endoscopy

## 2025-01-15 ENCOUNTER — APPOINTMENT (OUTPATIENT)
Dept: MRI IMAGING | Facility: CLINIC | Age: 60
End: 2025-01-15
Payer: COMMERCIAL

## 2025-01-15 PROCEDURE — 74183 MRI ABD W/O CNTR FLWD CNTR: CPT

## 2025-01-15 PROCEDURE — A9585: CPT

## 2025-01-21 NOTE — ASU PATIENT PROFILE, ADULT - FALL HARM RISK - UNIVERSAL INTERVENTIONS
Bed in lowest position, wheels locked, appropriate side rails in place/Call bell, personal items and telephone in reach/Instruct patient to call for assistance before getting out of bed or chair/Non-slip footwear when patient is out of bed/[place] to call system/Physically safe environment - no spills, clutter or unnecessary equipment/Purposeful Proactive Rounding/Room/bathroom lighting operational, light cord in reach

## 2025-01-22 ENCOUNTER — APPOINTMENT (OUTPATIENT)
Dept: HEPATOLOGY | Facility: HOSPITAL | Age: 60
End: 2025-01-22

## 2025-01-22 ENCOUNTER — RESULT REVIEW (OUTPATIENT)
Age: 60
End: 2025-01-22

## 2025-01-22 ENCOUNTER — OUTPATIENT (OUTPATIENT)
Dept: OUTPATIENT SERVICES | Facility: HOSPITAL | Age: 60
LOS: 1 days | Discharge: HOME | End: 2025-01-22
Payer: COMMERCIAL

## 2025-01-22 VITALS
HEART RATE: 64 BPM | RESPIRATION RATE: 18 BRPM | DIASTOLIC BLOOD PRESSURE: 68 MMHG | SYSTOLIC BLOOD PRESSURE: 118 MMHG | TEMPERATURE: 98 F | OXYGEN SATURATION: 100 %

## 2025-01-22 VITALS
OXYGEN SATURATION: 99 % | RESPIRATION RATE: 19 BRPM | SYSTOLIC BLOOD PRESSURE: 120 MMHG | DIASTOLIC BLOOD PRESSURE: 77 MMHG | HEART RATE: 62 BPM

## 2025-01-22 VITALS
DIASTOLIC BLOOD PRESSURE: 69 MMHG | HEART RATE: 63 BPM | TEMPERATURE: 98 F | RESPIRATION RATE: 18 BRPM | WEIGHT: 195.99 LBS | SYSTOLIC BLOOD PRESSURE: 113 MMHG | OXYGEN SATURATION: 100 %

## 2025-01-22 DIAGNOSIS — Z90.49 ACQUIRED ABSENCE OF OTHER SPECIFIED PARTS OF DIGESTIVE TRACT: Chronic | ICD-10-CM

## 2025-01-22 DIAGNOSIS — I85.00 ESOPHAGEAL VARICES WITHOUT BLEEDING: ICD-10-CM

## 2025-01-22 LAB
GLUCOSE BLDC GLUCOMTR-MCNC: 124 MG/DL — HIGH (ref 70–99)
GLUCOSE BLDC GLUCOMTR-MCNC: 132 MG/DL — HIGH (ref 70–99)

## 2025-01-22 PROCEDURE — 45398 COLONOSCOPY W/BAND LIGATION: CPT

## 2025-01-22 PROCEDURE — 88112 CYTOPATH CELL ENHANCE TECH: CPT | Mod: 26

## 2025-01-22 PROCEDURE — 43239 EGD BIOPSY SINGLE/MULTIPLE: CPT

## 2025-01-22 PROCEDURE — 88312 SPECIAL STAINS GROUP 1: CPT | Mod: 26

## 2025-01-22 PROCEDURE — 88305 TISSUE EXAM BY PATHOLOGIST: CPT | Mod: 26

## 2025-01-22 DEVICE — LIGATOR MULTIBAND 9.5-11.5MM: Type: IMPLANTABLE DEVICE | Status: FUNCTIONAL

## 2025-01-22 RX ORDER — POLYETHYLENE GLYCOL 3350 17 G/17G
17 POWDER, FOR SOLUTION ORAL DAILY
Qty: 1 | Refills: 0 | Status: ACTIVE | COMMUNITY
Start: 2025-01-22 | End: 1900-01-01

## 2025-01-23 ENCOUNTER — APPOINTMENT (OUTPATIENT)
Dept: INTERNAL MEDICINE | Facility: CLINIC | Age: 60
End: 2025-01-23
Payer: COMMERCIAL

## 2025-01-23 ENCOUNTER — RX RENEWAL (OUTPATIENT)
Age: 60
End: 2025-01-23

## 2025-01-23 VITALS
OXYGEN SATURATION: 98 % | TEMPERATURE: 98.6 F | DIASTOLIC BLOOD PRESSURE: 71 MMHG | HEART RATE: 63 BPM | WEIGHT: 196 LBS | RESPIRATION RATE: 16 BRPM | SYSTOLIC BLOOD PRESSURE: 118 MMHG | BODY MASS INDEX: 25.98 KG/M2 | HEIGHT: 73 IN

## 2025-01-23 DIAGNOSIS — E11.9 TYPE 2 DIABETES MELLITUS W/OUT COMPLICATIONS: ICD-10-CM

## 2025-01-23 DIAGNOSIS — Z79.01 LONG TERM (CURRENT) USE OF ANTICOAGULANTS: ICD-10-CM

## 2025-01-23 DIAGNOSIS — I10 ESSENTIAL (PRIMARY) HYPERTENSION: ICD-10-CM

## 2025-01-23 PROBLEM — K86.9 DISEASE OF PANCREAS, UNSPECIFIED: Chronic | Status: ACTIVE | Noted: 2025-01-13

## 2025-01-23 PROBLEM — K63.5 POLYP OF COLON: Chronic | Status: ACTIVE | Noted: 2025-01-13

## 2025-01-23 PROBLEM — K74.60 UNSPECIFIED CIRRHOSIS OF LIVER: Chronic | Status: ACTIVE | Noted: 2025-01-13

## 2025-01-23 PROBLEM — D69.6 THROMBOCYTOPENIA, UNSPECIFIED: Chronic | Status: ACTIVE | Noted: 2025-01-13

## 2025-01-23 PROBLEM — K21.9 GASTRO-ESOPHAGEAL REFLUX DISEASE WITHOUT ESOPHAGITIS: Chronic | Status: ACTIVE | Noted: 2025-01-13

## 2025-01-23 PROBLEM — F10.10 ALCOHOL ABUSE, UNCOMPLICATED: Chronic | Status: ACTIVE | Noted: 2025-01-13

## 2025-01-23 PROCEDURE — G2211 COMPLEX E/M VISIT ADD ON: CPT | Mod: NC

## 2025-01-23 PROCEDURE — 99213 OFFICE O/P EST LOW 20 MIN: CPT

## 2025-01-24 LAB
ALBUMIN SERPL ELPH-MCNC: 3.8 G/DL
ALP BLD-CCNC: 129 U/L
ALT SERPL-CCNC: 53 U/L
ANION GAP SERPL CALC-SCNC: 12 MMOL/L
AST SERPL-CCNC: 85 U/L
BASOPHILS # BLD AUTO: 0.02 K/UL
BASOPHILS NFR BLD AUTO: 0.4 %
BILIRUB SERPL-MCNC: 1.6 MG/DL
BUN SERPL-MCNC: 11 MG/DL
CALCIUM SERPL-MCNC: 9.4 MG/DL
CHLORIDE SERPL-SCNC: 100 MMOL/L
CO2 SERPL-SCNC: 27 MMOL/L
CREAT SERPL-MCNC: 0.78 MG/DL
EGFR: 102 ML/MIN/1.73M2
EOSINOPHIL # BLD AUTO: 0.08 K/UL
EOSINOPHIL NFR BLD AUTO: 1.7 %
ESTIMATED AVERAGE GLUCOSE: 160 MG/DL
GLUCOSE SERPL-MCNC: 148 MG/DL
HBA1C MFR BLD HPLC: 7.2 %
HCT VFR BLD CALC: 39.4 %
HGB BLD-MCNC: 13 G/DL
IMM GRANULOCYTES NFR BLD AUTO: 0.2 %
LYMPHOCYTES # BLD AUTO: 1.4 K/UL
LYMPHOCYTES NFR BLD AUTO: 29.4 %
MAN DIFF?: NORMAL
MCHC RBC-ENTMCNC: 30.7 PG
MCHC RBC-ENTMCNC: 33 G/DL
MCV RBC AUTO: 93.1 FL
MONOCYTES # BLD AUTO: 0.7 K/UL
MONOCYTES NFR BLD AUTO: 14.7 %
NEUTROPHILS # BLD AUTO: 2.55 K/UL
NEUTROPHILS NFR BLD AUTO: 53.6 %
NON-GYNECOLOGICAL CYTOLOGY STUDY: SIGNIFICANT CHANGE UP
PLATELET # BLD AUTO: 66 K/UL
POTASSIUM SERPL-SCNC: 3.9 MMOL/L
PROT SERPL-MCNC: 7.6 G/DL
RBC # BLD: 4.23 M/UL
RBC # FLD: 14.5 %
SODIUM SERPL-SCNC: 138 MMOL/L
TSH SERPL-ACNC: 1.51 UIU/ML
WBC # FLD AUTO: 4.76 K/UL

## 2025-01-27 PROBLEM — I42.8 OTHER CARDIOMYOPATHIES: Chronic | Status: ACTIVE | Noted: 2025-01-13

## 2025-01-27 LAB — SURGICAL PATHOLOGY STUDY: SIGNIFICANT CHANGE UP

## 2025-01-31 PROBLEM — B37.81 CANDIDA ESOPHAGITIS: Status: ACTIVE | Noted: 2025-01-31

## 2025-02-05 DIAGNOSIS — B37.81 CANDIDAL ESOPHAGITIS: ICD-10-CM

## 2025-02-05 RX ORDER — ENOXAPARIN SODIUM 80 MG/.8ML
80 INJECTION, SOLUTION SUBCUTANEOUS
Qty: 28 | Refills: 0 | Status: DISCONTINUED | COMMUNITY
Start: 2025-02-05 | End: 2025-02-05

## 2025-02-05 RX ORDER — ISOPROPYL ALCOHOL 0.7 ML/ML
SWAB TOPICAL
Qty: 1 | Refills: 0 | Status: DISCONTINUED | COMMUNITY
Start: 2025-02-05 | End: 2025-02-05

## 2025-02-05 RX ORDER — FLUCONAZOLE 200 MG/1
200 TABLET ORAL
Qty: 15 | Refills: 0 | Status: DISCONTINUED | COMMUNITY
Start: 2025-02-05 | End: 2025-02-05

## 2025-02-05 RX ORDER — CONTAINER,EMPTY
EACH MISCELLANEOUS
Qty: 1 | Refills: 0 | Status: DISCONTINUED | COMMUNITY
Start: 2025-02-05 | End: 2025-02-05

## 2025-02-05 RX ORDER — ENOXAPARIN SODIUM 80 MG/.8ML
80 INJECTION, SOLUTION SUBCUTANEOUS
Qty: 28 | Refills: 0 | Status: ACTIVE | COMMUNITY
Start: 2025-02-05 | End: 1900-01-01

## 2025-02-05 RX ORDER — ISOPROPYL ALCOHOL 0.7 ML/ML
SWAB TOPICAL
Qty: 1 | Refills: 0 | Status: COMPLETED | COMMUNITY
Start: 2025-02-05 | End: 2025-02-06

## 2025-02-06 DIAGNOSIS — I48.91 UNSPECIFIED ATRIAL FIBRILLATION: ICD-10-CM

## 2025-02-06 RX ORDER — CONTAINER,EMPTY
EACH MISCELLANEOUS
Qty: 1 | Refills: 0 | Status: ACTIVE | COMMUNITY
Start: 2025-02-05 | End: 1900-01-01

## 2025-02-06 RX ORDER — FLUCONAZOLE 200 MG/1
200 TABLET ORAL
Qty: 15 | Refills: 0 | Status: ACTIVE | COMMUNITY
Start: 2025-02-05 | End: 1900-01-01

## 2025-02-07 ENCOUNTER — RX RENEWAL (OUTPATIENT)
Age: 60
End: 2025-02-07

## 2025-02-10 ENCOUNTER — RX RENEWAL (OUTPATIENT)
Age: 60
End: 2025-02-10

## 2025-02-11 ENCOUNTER — APPOINTMENT (OUTPATIENT)
Dept: HEPATOLOGY | Facility: CLINIC | Age: 60
End: 2025-02-11

## 2025-02-12 ENCOUNTER — NON-APPOINTMENT (OUTPATIENT)
Age: 60
End: 2025-02-12

## 2025-02-13 ENCOUNTER — NON-APPOINTMENT (OUTPATIENT)
Age: 60
End: 2025-02-13

## 2025-02-18 ENCOUNTER — APPOINTMENT (OUTPATIENT)
Dept: CARDIOLOGY | Facility: CLINIC | Age: 60
End: 2025-02-18
Payer: COMMERCIAL

## 2025-02-18 ENCOUNTER — NON-APPOINTMENT (OUTPATIENT)
Age: 60
End: 2025-02-18

## 2025-02-18 VITALS
TEMPERATURE: 98 F | OXYGEN SATURATION: 97 % | HEART RATE: 69 BPM | SYSTOLIC BLOOD PRESSURE: 160 MMHG | WEIGHT: 205 LBS | DIASTOLIC BLOOD PRESSURE: 87 MMHG | BODY MASS INDEX: 27.17 KG/M2 | HEIGHT: 73 IN

## 2025-02-18 DIAGNOSIS — K70.31 ALCOHOLIC CIRRHOSIS OF LIVER WITH ASCITES: ICD-10-CM

## 2025-02-18 DIAGNOSIS — I10 ESSENTIAL (PRIMARY) HYPERTENSION: ICD-10-CM

## 2025-02-18 DIAGNOSIS — I42.9 CARDIOMYOPATHY, UNSPECIFIED: ICD-10-CM

## 2025-02-18 PROCEDURE — 99215 OFFICE O/P EST HI 40 MIN: CPT

## 2025-02-19 ENCOUNTER — NON-APPOINTMENT (OUTPATIENT)
Age: 60
End: 2025-02-19

## 2025-03-10 ENCOUNTER — APPOINTMENT (OUTPATIENT)
Dept: HEPATOLOGY | Facility: CLINIC | Age: 60
End: 2025-03-10

## 2025-03-19 ENCOUNTER — NON-APPOINTMENT (OUTPATIENT)
Age: 60
End: 2025-03-19

## 2025-03-27 ENCOUNTER — RX RENEWAL (OUTPATIENT)
Age: 60
End: 2025-03-27

## 2025-04-07 ENCOUNTER — APPOINTMENT (OUTPATIENT)
Dept: CARDIOLOGY | Facility: CLINIC | Age: 60
End: 2025-04-07

## 2025-04-22 ENCOUNTER — APPOINTMENT (OUTPATIENT)
Dept: HEPATOLOGY | Facility: CLINIC | Age: 60
End: 2025-04-22

## 2025-05-02 ENCOUNTER — APPOINTMENT (OUTPATIENT)
Dept: HEPATOLOGY | Facility: CLINIC | Age: 60
End: 2025-05-02
Payer: COMMERCIAL

## 2025-05-02 VITALS
WEIGHT: 208 LBS | HEIGHT: 73 IN | DIASTOLIC BLOOD PRESSURE: 83 MMHG | HEART RATE: 58 BPM | TEMPERATURE: 97.4 F | OXYGEN SATURATION: 99 % | SYSTOLIC BLOOD PRESSURE: 163 MMHG | BODY MASS INDEX: 27.57 KG/M2

## 2025-05-02 DIAGNOSIS — I85.00 ESOPHAGEAL VARICES W/OUT BLEEDING: ICD-10-CM

## 2025-05-02 DIAGNOSIS — I86.4 ESOPHAGEAL VARICES W/OUT BLEEDING: ICD-10-CM

## 2025-05-02 DIAGNOSIS — K76.6 PORTAL HYPERTENSION: ICD-10-CM

## 2025-05-02 DIAGNOSIS — Z79.01 LONG TERM (CURRENT) USE OF ANTICOAGULANTS: ICD-10-CM

## 2025-05-02 DIAGNOSIS — K64.9 UNSPECIFIED HEMORRHOIDS: ICD-10-CM

## 2025-05-02 DIAGNOSIS — K25.9 GASTRIC ULCER, UNSPECIFIED AS ACUTE OR CHRONIC, W/OUT HEMORRHAGE OR PERFORATION: ICD-10-CM

## 2025-05-02 DIAGNOSIS — K86.9 DISEASE OF PANCREAS, UNSPECIFIED: ICD-10-CM

## 2025-05-02 DIAGNOSIS — B37.81 CANDIDAL ESOPHAGITIS: ICD-10-CM

## 2025-05-02 DIAGNOSIS — I85.00 PORTAL HYPERTENSION: ICD-10-CM

## 2025-05-02 DIAGNOSIS — Z79.899 OTHER LONG TERM (CURRENT) DRUG THERAPY: ICD-10-CM

## 2025-05-02 DIAGNOSIS — R18.8 OTHER ASCITES: ICD-10-CM

## 2025-05-02 DIAGNOSIS — F10.20 ALCOHOL DEPENDENCE, UNCOMPLICATED: ICD-10-CM

## 2025-05-02 LAB
AFP-TM SERPL-MCNC: 3.3 NG/ML
ALBUMIN SERPL ELPH-MCNC: 3.8 G/DL
ALP BLD-CCNC: 142 U/L
ALT SERPL-CCNC: 61 U/L
ANION GAP SERPL CALC-SCNC: 12 MMOL/L
AST SERPL-CCNC: 71 U/L
BASOPHILS # BLD AUTO: 0.02 K/UL
BASOPHILS NFR BLD AUTO: 0.6 %
BILIRUB SERPL-MCNC: 1.3 MG/DL
BUN SERPL-MCNC: 10 MG/DL
CALCIUM SERPL-MCNC: 8.7 MG/DL
CHLORIDE SERPL-SCNC: 107 MMOL/L
CO2 SERPL-SCNC: 25 MMOL/L
CREAT SERPL-MCNC: 0.71 MG/DL
EGFRCR SERPLBLD CKD-EPI 2021: 105 ML/MIN/1.73M2
EOSINOPHIL # BLD AUTO: 0.13 K/UL
EOSINOPHIL NFR BLD AUTO: 3.7 %
GLUCOSE SERPL-MCNC: 96 MG/DL
HCT VFR BLD CALC: 36 %
HGB BLD-MCNC: 11.9 G/DL
IMM GRANULOCYTES NFR BLD AUTO: 0.3 %
INR PPP: 1.15 RATIO
LYMPHOCYTES # BLD AUTO: 1.29 K/UL
LYMPHOCYTES NFR BLD AUTO: 36.8 %
MAN DIFF?: NORMAL
MCHC RBC-ENTMCNC: 32.2 PG
MCHC RBC-ENTMCNC: 33.1 G/DL
MCV RBC AUTO: 97.6 FL
MONOCYTES # BLD AUTO: 0.54 K/UL
MONOCYTES NFR BLD AUTO: 15.4 %
NEUTROPHILS # BLD AUTO: 1.52 K/UL
NEUTROPHILS NFR BLD AUTO: 43.2 %
PLATELET # BLD AUTO: 62 K/UL
POTASSIUM SERPL-SCNC: 3.5 MMOL/L
PROT SERPL-MCNC: 7.2 G/DL
PT BLD: 13.5 SEC
RBC # BLD: 3.69 M/UL
RBC # FLD: 15 %
SODIUM SERPL-SCNC: 144 MMOL/L
WBC # FLD AUTO: 3.51 K/UL

## 2025-05-02 PROCEDURE — 99215 OFFICE O/P EST HI 40 MIN: CPT

## 2025-05-02 PROCEDURE — G2211 COMPLEX E/M VISIT ADD ON: CPT | Mod: NC

## 2025-05-09 ENCOUNTER — RX RENEWAL (OUTPATIENT)
Age: 60
End: 2025-05-09

## 2025-05-12 ENCOUNTER — RX RENEWAL (OUTPATIENT)
Age: 60
End: 2025-05-12

## 2025-05-15 ENCOUNTER — NON-APPOINTMENT (OUTPATIENT)
Age: 60
End: 2025-05-15

## 2025-05-21 ENCOUNTER — NON-APPOINTMENT (OUTPATIENT)
Age: 60
End: 2025-05-21

## 2025-06-05 ENCOUNTER — NON-APPOINTMENT (OUTPATIENT)
Age: 60
End: 2025-06-05

## 2025-06-12 ENCOUNTER — NON-APPOINTMENT (OUTPATIENT)
Age: 60
End: 2025-06-12

## 2025-06-16 ENCOUNTER — APPOINTMENT (OUTPATIENT)
Dept: HEPATOLOGY | Facility: CLINIC | Age: 60
End: 2025-06-16
Payer: COMMERCIAL

## 2025-06-16 VITALS
OXYGEN SATURATION: 99 % | BODY MASS INDEX: 26.9 KG/M2 | SYSTOLIC BLOOD PRESSURE: 147 MMHG | WEIGHT: 203 LBS | HEART RATE: 56 BPM | TEMPERATURE: 97.4 F | DIASTOLIC BLOOD PRESSURE: 98 MMHG | HEIGHT: 73 IN

## 2025-06-16 PROCEDURE — 99214 OFFICE O/P EST MOD 30 MIN: CPT

## 2025-06-16 PROCEDURE — G2211 COMPLEX E/M VISIT ADD ON: CPT | Mod: NC

## 2025-06-17 LAB
FERRITIN SERPL-MCNC: 78 NG/ML
IRON SATN MFR SERPL: 37 %
IRON SERPL-MCNC: 139 UG/DL
TIBC SERPL-MCNC: 376 UG/DL
UIBC SERPL-MCNC: 237 UG/DL

## 2025-06-18 ENCOUNTER — APPOINTMENT (OUTPATIENT)
Dept: CARDIOLOGY | Facility: CLINIC | Age: 60
End: 2025-06-18

## 2025-06-20 LAB — FLOW CYTOMETRY FINAL REPORT: NORMAL

## 2025-07-30 ENCOUNTER — APPOINTMENT (OUTPATIENT)
Dept: INTERNAL MEDICINE | Facility: CLINIC | Age: 60
End: 2025-07-30
Payer: COMMERCIAL

## 2025-07-30 VITALS
OXYGEN SATURATION: 94 % | WEIGHT: 192 LBS | BODY MASS INDEX: 25.45 KG/M2 | HEART RATE: 72 BPM | TEMPERATURE: 99.6 F | HEIGHT: 73 IN | DIASTOLIC BLOOD PRESSURE: 88 MMHG | SYSTOLIC BLOOD PRESSURE: 152 MMHG

## 2025-07-30 DIAGNOSIS — J20.9 ACUTE BRONCHITIS, UNSPECIFIED: ICD-10-CM

## 2025-07-30 PROCEDURE — 99213 OFFICE O/P EST LOW 20 MIN: CPT

## 2025-07-30 RX ORDER — AZITHROMYCIN 250 MG/1
250 TABLET, FILM COATED ORAL
Qty: 1 | Refills: 0 | Status: ACTIVE | COMMUNITY
Start: 2025-07-30 | End: 1900-01-01

## 2025-07-30 RX ORDER — FLUTICASONE PROPIONATE 50 UG/1
50 SPRAY NASAL
Qty: 1 | Refills: 0 | Status: ACTIVE | COMMUNITY
Start: 2025-07-30 | End: 1900-01-01

## 2025-08-05 DIAGNOSIS — J84.9 INTERSTITIAL PULMONARY DISEASE, UNSPECIFIED: ICD-10-CM

## 2025-08-05 RX ORDER — LEVOFLOXACIN 750 MG/1
750 TABLET, FILM COATED ORAL DAILY
Qty: 7 | Refills: 0 | Status: ACTIVE | COMMUNITY
Start: 2025-08-05 | End: 1900-01-01

## 2025-08-07 ENCOUNTER — RX RENEWAL (OUTPATIENT)
Age: 60
End: 2025-08-07

## 2025-08-11 ENCOUNTER — APPOINTMENT (OUTPATIENT)
Dept: HEPATOLOGY | Facility: CLINIC | Age: 60
End: 2025-08-11
Payer: COMMERCIAL

## 2025-08-11 VITALS
OXYGEN SATURATION: 98 % | TEMPERATURE: 98.1 F | BODY MASS INDEX: 24.67 KG/M2 | SYSTOLIC BLOOD PRESSURE: 136 MMHG | HEART RATE: 87 BPM | WEIGHT: 187 LBS | DIASTOLIC BLOOD PRESSURE: 80 MMHG

## 2025-08-11 DIAGNOSIS — I85.00 PORTAL HYPERTENSION: ICD-10-CM

## 2025-08-11 DIAGNOSIS — R74.8 ABNORMAL LEVELS OF OTHER SERUM ENZYMES: ICD-10-CM

## 2025-08-11 DIAGNOSIS — K76.6 PORTAL HYPERTENSION: ICD-10-CM

## 2025-08-11 LAB
AFP-TM SERPL-MCNC: 3.1 NG/ML
ALBUMIN SERPL ELPH-MCNC: 3.9 G/DL
ALP BLD-CCNC: 151 U/L
ALT SERPL-CCNC: 73 U/L
ANION GAP SERPL CALC-SCNC: 13 MMOL/L
AST SERPL-CCNC: 124 U/L
BILIRUB SERPL-MCNC: 1.9 MG/DL
BUN SERPL-MCNC: 5 MG/DL
CALCIUM SERPL-MCNC: 9 MG/DL
CERULOPLASMIN SERPL-MCNC: 30 MG/DL
CHLORIDE SERPL-SCNC: 103 MMOL/L
CO2 SERPL-SCNC: 24 MMOL/L
CREAT SERPL-MCNC: 0.78 MG/DL
EGFRCR SERPLBLD CKD-EPI 2021: 102 ML/MIN/1.73M2
GGT SERPL-CCNC: 420 U/L
GLUCOSE SERPL-MCNC: 116 MG/DL
INR PPP: 1.15 RATIO
POTASSIUM SERPL-SCNC: 4.3 MMOL/L
PROT SERPL-MCNC: 8.2 G/DL
PT BLD: 13.6 SEC
SODIUM SERPL-SCNC: 140 MMOL/L

## 2025-08-11 PROCEDURE — 99204 OFFICE O/P NEW MOD 45 MIN: CPT

## 2025-08-12 LAB
BASOPHILS # BLD AUTO: 0.02 K/UL
BASOPHILS NFR BLD AUTO: 0.4 %
EOSINOPHIL # BLD AUTO: 0.06 K/UL
EOSINOPHIL NFR BLD AUTO: 1.3 %
HBV CORE IGG+IGM SER QL: NONREACTIVE
HBV DNA # SERPL NAA+PROBE: NOT DETECTED IU/ML
HBV SURFACE AB SERPL IA-ACNC: <3.3 MIU/ML
HCT VFR BLD CALC: 40.9 %
HCV AB SER QL: NONREACTIVE
HCV S/CO RATIO: 0.16 S/CO
HEPATITIS A IGG ANTIBODY: REACTIVE
HEPB DNA PCR INT: NOT DETECTED
HEPB DNA PCR LOG: NOT DETECTED LOGIU/ML
HGB BLD-MCNC: 13.3 G/DL
IGG SERPL-MCNC: 2963 MG/DL
IGM SERPL-MCNC: 235 MG/DL
IMM GRANULOCYTES NFR BLD AUTO: 0.2 %
LYMPHOCYTES # BLD AUTO: 0.91 K/UL
LYMPHOCYTES NFR BLD AUTO: 19 %
MAN DIFF?: NORMAL
MCHC RBC-ENTMCNC: 31.4 PG
MCHC RBC-ENTMCNC: 32.5 G/DL
MCV RBC AUTO: 96.7 FL
MONOCYTES # BLD AUTO: 0.66 K/UL
MONOCYTES NFR BLD AUTO: 13.8 %
NEUTROPHILS # BLD AUTO: 3.12 K/UL
NEUTROPHILS NFR BLD AUTO: 65.3 %
PLATELET # BLD AUTO: 82 K/UL
RBC # BLD: 4.23 M/UL
RBC # FLD: 15.4 %
SMOOTH MUSCLE AB SER QL IF: NORMAL
WBC # FLD AUTO: 4.78 K/UL

## 2025-08-13 LAB
ACE BLD-CCNC: 108 U/L
ANA TITR SER: ABNORMAL
ANA TITR SER: ABNORMAL
LKM AB SER QL IF: <20.1 UNITS
MITOCHONDRIAL (M2) AB, SERUM: 107.1 UNITS

## 2025-08-14 LAB
A1AT PHENOTYP SERPL-IMP: NORMAL
A1AT SERPL-MCNC: 163 MG/DL

## 2025-08-15 LAB
ANTI - GP210 ANTIBODY, IGG: 1.2 UNITS
ANTI - SP100 ANTIBODY, IGG: 30.1 UNITS

## 2025-08-26 ENCOUNTER — APPOINTMENT (OUTPATIENT)
Dept: INTERNAL MEDICINE | Facility: CLINIC | Age: 60
End: 2025-08-26
Payer: COMMERCIAL

## 2025-08-26 VITALS
DIASTOLIC BLOOD PRESSURE: 82 MMHG | RESPIRATION RATE: 15 BRPM | HEART RATE: 55 BPM | SYSTOLIC BLOOD PRESSURE: 171 MMHG | WEIGHT: 189 LBS | HEIGHT: 73 IN | TEMPERATURE: 98.4 F | BODY MASS INDEX: 25.05 KG/M2 | OXYGEN SATURATION: 97 %

## 2025-08-26 DIAGNOSIS — I48.91 UNSPECIFIED ATRIAL FIBRILLATION: ICD-10-CM

## 2025-08-26 DIAGNOSIS — I10 ESSENTIAL (PRIMARY) HYPERTENSION: ICD-10-CM

## 2025-08-26 PROCEDURE — 99213 OFFICE O/P EST LOW 20 MIN: CPT

## 2025-08-27 ENCOUNTER — RX RENEWAL (OUTPATIENT)
Age: 60
End: 2025-08-27

## (undated) DEVICE — CONTAINER FORMALIN 80ML YELLOW

## (undated) DEVICE — SALIVA EJECTOR (BLUE)

## (undated) DEVICE — PACK IV START WITH CHG

## (undated) DEVICE — CATH IV SAFE BC 22G X 1" (BLUE)

## (undated) DEVICE — TUBING SUCTION NONCONDUCTIVE 6MM X 12FT

## (undated) DEVICE — BITE BLOCK ADULT 20 X 27MM (GREEN)

## (undated) DEVICE — DRSG CURITY GAUZE SPONGE 4 X 4" 12-PLY NON-STERILE

## (undated) DEVICE — GOWN LG

## (undated) DEVICE — DRSG 2X2

## (undated) DEVICE — ELCTR GROUNDING PAD ADULT COVIDIEN

## (undated) DEVICE — ELCTR ECG CONDUCTIVE ADHESIVE

## (undated) DEVICE — TUBING IV SET GRAVITY 3Y 100" MACRO

## (undated) DEVICE — DENTURE CUP PINK

## (undated) DEVICE — BASIN EMESIS 10IN GRADUATED MAUVE

## (undated) DEVICE — BRUSH COLONOSCOPY CYTOLOGY

## (undated) DEVICE — LINE BREATHE SAMPLNG

## (undated) DEVICE — BIOPSY FORCEP COLD DISP

## (undated) DEVICE — UNDERPAD LINEN SAVER 17 X 24"

## (undated) DEVICE — CLAMP BX HOT RAD JAW 3

## (undated) DEVICE — DRSG BANDAID 0.75X3"

## (undated) DEVICE — BIOPSY FORCEP RADIAL JAW 4 STANDARD WITH NEEDLE

## (undated) DEVICE — LUBRICATING JELLY HR ONE SHOT 3G

## (undated) DEVICE — TUBING MEDI-VAC W MAXIGRIP CONNECTORS 1/4"X6'